# Patient Record
Sex: FEMALE | URBAN - METROPOLITAN AREA
[De-identification: names, ages, dates, MRNs, and addresses within clinical notes are randomized per-mention and may not be internally consistent; named-entity substitution may affect disease eponyms.]

---

## 2017-08-27 ENCOUNTER — HOSPITAL ENCOUNTER (EMERGENCY)
Facility: CLINIC | Age: 42
Discharge: HOME | End: 2017-08-27
Attending: EMERGENCY MEDICINE | Admitting: EMERGENCY MEDICINE
Payer: COMMERCIAL

## 2017-08-27 VITALS
BODY MASS INDEX: 27.46 KG/M2 | SYSTOLIC BLOOD PRESSURE: 142 MMHG | OXYGEN SATURATION: 98 % | HEART RATE: 100 BPM | RESPIRATION RATE: 16 BRPM | DIASTOLIC BLOOD PRESSURE: 86 MMHG | TEMPERATURE: 98 F | WEIGHT: 160 LBS

## 2017-08-27 DIAGNOSIS — F10.29 ALCOHOL DEPENDENCE WITH UNSPECIFIED ALCOHOL-INDUCED DISORDER (H): ICD-10-CM

## 2017-08-27 LAB
ALBUMIN SERPL-MCNC: 4 G/DL (ref 3.4–5)
ALP SERPL-CCNC: 141 U/L (ref 40–150)
ALT SERPL W P-5'-P-CCNC: 288 U/L (ref 0–50)
AMPHETAMINES UR QL SCN: NEGATIVE
ANION GAP SERPL CALCULATED.3IONS-SCNC: 18 MMOL/L (ref 3–14)
AST SERPL W P-5'-P-CCNC: 242 U/L (ref 0–45)
BARBITURATES UR QL: NEGATIVE
BASOPHILS # BLD AUTO: 0 10E9/L (ref 0–0.2)
BASOPHILS NFR BLD AUTO: 0.4 %
BENZODIAZ UR QL: NEGATIVE
BILIRUB SERPL-MCNC: 1.1 MG/DL (ref 0.2–1.3)
BUN SERPL-MCNC: 14 MG/DL (ref 7–30)
CALCIUM SERPL-MCNC: 8.6 MG/DL (ref 8.5–10.1)
CANNABINOIDS UR QL SCN: NEGATIVE
CHLORIDE SERPL-SCNC: 101 MMOL/L (ref 94–109)
CO2 SERPL-SCNC: 19 MMOL/L (ref 20–32)
COCAINE UR QL: NEGATIVE
CREAT SERPL-MCNC: 0.56 MG/DL (ref 0.52–1.04)
DIFFERENTIAL METHOD BLD: NORMAL
EOSINOPHIL # BLD AUTO: 0 10E9/L (ref 0–0.7)
EOSINOPHIL NFR BLD AUTO: 0.1 %
ERYTHROCYTE [DISTWIDTH] IN BLOOD BY AUTOMATED COUNT: 12.1 % (ref 10–15)
ETHANOL SERPL-MCNC: <0.01 G/DL
GFR SERPL CREATININE-BSD FRML MDRD: >90 ML/MIN/1.7M2
GLUCOSE SERPL-MCNC: 138 MG/DL (ref 70–99)
HCT VFR BLD AUTO: 41.2 % (ref 35–47)
HGB BLD-MCNC: 15 G/DL (ref 11.7–15.7)
IMM GRANULOCYTES # BLD: 0 10E9/L (ref 0–0.4)
IMM GRANULOCYTES NFR BLD: 0.1 %
LIPASE SERPL-CCNC: 81 U/L (ref 73–393)
LYMPHOCYTES # BLD AUTO: 0.9 10E9/L (ref 0.8–5.3)
LYMPHOCYTES NFR BLD AUTO: 11.9 %
MAGNESIUM SERPL-MCNC: 1.4 MG/DL (ref 1.6–2.3)
MCH RBC QN AUTO: 32.8 PG (ref 26.5–33)
MCHC RBC AUTO-ENTMCNC: 36.4 G/DL (ref 31.5–36.5)
MCV RBC AUTO: 90 FL (ref 78–100)
MONOCYTES # BLD AUTO: 0.3 10E9/L (ref 0–1.3)
MONOCYTES NFR BLD AUTO: 3.5 %
NEUTROPHILS # BLD AUTO: 6.6 10E9/L (ref 1.6–8.3)
NEUTROPHILS NFR BLD AUTO: 84 %
NRBC # BLD AUTO: 0 10*3/UL
NRBC BLD AUTO-RTO: 0 /100
OPIATES UR QL SCN: NEGATIVE
PCP UR QL SCN: NEGATIVE
PLATELET # BLD AUTO: 273 10E9/L (ref 150–450)
POTASSIUM SERPL-SCNC: 3.4 MMOL/L (ref 3.4–5.3)
PROT SERPL-MCNC: 7.7 G/DL (ref 6.8–8.8)
RBC # BLD AUTO: 4.58 10E12/L (ref 3.8–5.2)
SODIUM SERPL-SCNC: 138 MMOL/L (ref 133–144)
WBC # BLD AUTO: 7.9 10E9/L (ref 4–11)

## 2017-08-27 PROCEDURE — 80307 DRUG TEST PRSMV CHEM ANLYZR: CPT | Performed by: EMERGENCY MEDICINE

## 2017-08-27 PROCEDURE — 96361 HYDRATE IV INFUSION ADD-ON: CPT

## 2017-08-27 PROCEDURE — 25000125 ZZHC RX 250: Performed by: EMERGENCY MEDICINE

## 2017-08-27 PROCEDURE — 83735 ASSAY OF MAGNESIUM: CPT | Performed by: EMERGENCY MEDICINE

## 2017-08-27 PROCEDURE — 99285 EMERGENCY DEPT VISIT HI MDM: CPT | Mod: 25

## 2017-08-27 PROCEDURE — 85025 COMPLETE CBC W/AUTO DIFF WBC: CPT | Performed by: EMERGENCY MEDICINE

## 2017-08-27 PROCEDURE — 80320 DRUG SCREEN QUANTALCOHOLS: CPT | Performed by: EMERGENCY MEDICINE

## 2017-08-27 PROCEDURE — 96375 TX/PRO/DX INJ NEW DRUG ADDON: CPT

## 2017-08-27 PROCEDURE — 25000128 H RX IP 250 OP 636: Performed by: EMERGENCY MEDICINE

## 2017-08-27 PROCEDURE — 96365 THER/PROPH/DIAG IV INF INIT: CPT

## 2017-08-27 PROCEDURE — 83690 ASSAY OF LIPASE: CPT | Performed by: EMERGENCY MEDICINE

## 2017-08-27 PROCEDURE — 80053 COMPREHEN METABOLIC PANEL: CPT | Performed by: EMERGENCY MEDICINE

## 2017-08-27 RX ORDER — SODIUM CHLORIDE 9 MG/ML
1000 INJECTION, SOLUTION INTRAVENOUS CONTINUOUS
Status: DISCONTINUED | OUTPATIENT
Start: 2017-08-27 | End: 2017-08-28 | Stop reason: HOSPADM

## 2017-08-27 RX ORDER — ONDANSETRON 2 MG/ML
4 INJECTION INTRAMUSCULAR; INTRAVENOUS EVERY 30 MIN PRN
Status: DISCONTINUED | OUTPATIENT
Start: 2017-08-27 | End: 2017-08-28 | Stop reason: HOSPADM

## 2017-08-27 RX ORDER — LORAZEPAM 2 MG/ML
1-2 INJECTION INTRAMUSCULAR
Status: DISCONTINUED | OUTPATIENT
Start: 2017-08-27 | End: 2017-08-28 | Stop reason: HOSPADM

## 2017-08-27 RX ORDER — ONDANSETRON 4 MG/1
4 TABLET, ORALLY DISINTEGRATING ORAL EVERY 4 HOURS PRN
Qty: 10 TABLET | Refills: 0 | Status: SHIPPED | OUTPATIENT
Start: 2017-08-27 | End: 2017-08-30

## 2017-08-27 RX ADMIN — ONDANSETRON 4 MG: 2 SOLUTION INTRAMUSCULAR; INTRAVENOUS at 18:03

## 2017-08-27 RX ADMIN — SODIUM CHLORIDE 1000 ML: 9 INJECTION, SOLUTION INTRAVENOUS at 17:51

## 2017-08-27 RX ADMIN — FOLIC ACID: 5 INJECTION, SOLUTION INTRAMUSCULAR; INTRAVENOUS; SUBCUTANEOUS at 20:26

## 2017-08-27 RX ADMIN — LORAZEPAM 1 MG: 2 INJECTION INTRAMUSCULAR; INTRAVENOUS at 18:03

## 2017-08-27 ASSESSMENT — ENCOUNTER SYMPTOMS
TREMORS: 1
NERVOUS/ANXIOUS: 1
DIARRHEA: 0
FEVER: 0
VOMITING: 1
APPETITE CHANGE: 1
DYSURIA: 0
SHORTNESS OF BREATH: 0
HEADACHES: 0
FREQUENCY: 0
CONSTIPATION: 0
SPEECH DIFFICULTY: 0
HEMATURIA: 0
NAUSEA: 1
DIFFICULTY URINATING: 0
ABDOMINAL PAIN: 0

## 2017-08-27 NOTE — ED AVS SNAPSHOT
Emergency Department    [address] [place] [address]    Phone:  [phone]    Fax:  [phone]                                       Riya Ugalde   MRN: [id number]    Department:   Emergency Department   Date of Visit:  8/27/2017           After Visit Summary Signature Page     I have received my discharge instructions, and my questions have been answered. I have discussed any challenges I see with this plan with the nurse or doctor.    ..........................................................................................................................................  Patient/Patient Representative Signature      ..........................................................................................................................................  Patient Representative Print Name and Relationship to Patient    ..................................................               ................................................  Date                                            Time    ..........................................................................................................................................  Reviewed by Signature/Title    ...................................................              ..............................................  Date                                                            Time

## 2017-08-27 NOTE — ED AVS SNAPSHOT
Emergency Department    [address] [facility] [address]    Phone:  [phone]    Fax:  [phone]                                       Riya Ugalde   MRN: [id number]    Department:   Emergency Department   Date of Visit:  8/27/2017           Patient Information     Date Of Birth          1975        Your diagnoses for this visit were:     Alcohol dependence with unspecified alcohol-induced disorder (H)        You were seen by Rob Alaniz MD.      Follow-up Information     Schedule an appointment as soon as possible for a visit with Verenice Goetz.    Specialty:  Internal Medicine    Contact information:    [facility]  [address]  [place] [address] [phone]          Discharge Instructions         Alcohol Addiction  Are you addicted to alcohol?    You may be addicted to alcohol if your drinking harms yourself or others or leads to other problems with your daily life.  The medical term for this is alcohol use disorder. Your healthcare provider may diagnose you with this disorder if you have at least two of the following problems within the span of a year:    You drink alcohol in larger amounts or for a longer period than you intended.    You frequently want to cut down or control alcohol use, or have frequently failed efforts to do so.    You spend a lot of time getting alcohol, using alcohol, or recovering from alcohol use.    You crave or have a strong desire or urge to drink alcohol.    Ongoing alcohol use makes it hard for you to be responsible at work, school, or home.    You continue to use alcohol even though you have had problems in relationships or social settings because of it.    You give up or miss important social, work, or recreational activities because of alcohol use.    You drink alcohol in situations in which it is physically unsafe, such as drinking then driving while intoxicated.    You continue to drink alcohol despite  knowing it has caused physical or emotional problems.    You need more and more alcohol to get the same effects.    You hide how much alcohol you drink from family and friends.    You have withdrawal symptoms or use alcohol to avoid withdrawal symptoms.  Date Last Reviewed: 2/1/2017 2000-2017 [employer] [address] [place] [address]. All rights reserved. This information is not intended as a substitute for professional medical care. Always follow your healthcare professional's instructions.          Future Appointments        Provider Department Dept Phone Center    8/28/2017 10:00 AM Paz Villegas MD [facility] [phone] [facility]      24 Hour Appointment Hotline       To make an appointment at any [facility], call [phone]). If you don't have a family doctor or clinic, we will help you find one. [facility] clinics are conveniently located to serve the needs of you and your family.             Review of your medicines      START taking        Dose / Directions Last dose taken    ondansetron 4 MG ODT tab   Commonly known as:  ZOFRAN ODT   Dose:  4 mg   Quantity:  10 tablet        Take 1 tablet (4 mg) by mouth every 4 hours as needed   Refills:  0          Our records show that you are taking the medicines listed below. If these are incorrect, please call your family doctor or clinic.        Dose / Directions Last dose taken    Potassium Chloride ER 20 MEQ Tbcr   Dose:  20 mEq   Quantity:  3 tablet        Take 1 tablet (20 mEq) by mouth daily   Refills:  0        PROZAC PO   Dose:  20 mg        Take 20 mg by mouth   Refills:  0        sucralfate 1 GM/10ML suspension   Commonly known as:  CARAFATE   Dose:  1 g   Quantity:  420 mL        Take 10 mLs (1 g) by mouth 4 times daily   Refills:  1                Prescriptions were sent or printed at these locations (1 Prescription)                   Other Prescriptions                 Printed at Department/Unit printer (1 of 1)         ondansetron (ZOFRAN ODT) 4 MG ODT tab                Procedures and tests performed during your visit     Alcohol ethyl    CBC with platelets differential    Cardiac Continuous Monitoring    Comprehensive metabolic panel    Drug abuse screen 77 urine ([place],[facility])    Lipase    Magnesium    Pulse oximetry nursing      Orders Needing Specimen Collection     None      Pending Results     No orders found from 8/25/2017 to 8/28/2017.            Pending Culture Results     No orders found from 8/25/2017 to 8/28/2017.            Pending Results Instructions     If you had any lab results that were not finalized at the time of your Discharge, you can call the ED Lab Result RN at [phone]. You will be contacted by this team for any positive Lab results or changes in treatment. The nurses are available 7 days a week from 10A to 6:30P.  You can leave a message 24 hours per day and they will return your call.        Test Results From Your Hospital Stay        8/27/2017  6:18 PM      Component Results     Component Value Ref Range & Units Status    Ethanol g/dL <0.01 <0.01 g/dL Final         8/27/2017  6:04 PM      Component Results     Component Value Ref Range & Units Status    WBC 7.9 4.0 - 11.0 10e9/L Final    RBC Count 4.58 3.8 - 5.2 10e12/L Final    Hemoglobin 15.0 11.7 - 15.7 g/dL Final    Hematocrit 41.2 35.0 - 47.0 % Final    MCV 90 78 - 100 fl Final    MCH 32.8 26.5 - 33.0 pg Final    MCHC 36.4 31.5 - 36.5 g/dL Final    RDW 12.1 10.0 - 15.0 % Final    Platelet Count 273 150 - 450 10e9/L Final    Diff Method Automated Method  Final    % Neutrophils 84.0 % Final    % Lymphocytes 11.9 % Final    % Monocytes 3.5 % Final    % Eosinophils 0.1 % Final    % Basophils 0.4 % Final    % Immature Granulocytes 0.1 % Final    Nucleated RBCs 0 0 /100 Final    Absolute Neutrophil 6.6 1.6 - 8.3 10e9/L Final    Absolute Lymphocytes 0.9 0.8 - 5.3 10e9/L Final    Absolute Monocytes  0.3 0.0 - 1.3 10e9/L Final    Absolute Eosinophils 0.0 0.0 - 0.7 10e9/L Final    Absolute Basophils 0.0 0.0 - 0.2 10e9/L Final    Abs Immature Granulocytes 0.0 0 - 0.4 10e9/L Final    Absolute Nucleated RBC 0.0  Final         8/27/2017  6:20 PM      Component Results     Component Value Ref Range & Units Status    Sodium 138 133 - 144 mmol/L Final    Potassium 3.4 3.4 - 5.3 mmol/L Final    Chloride 101 94 - 109 mmol/L Final    Carbon Dioxide 19 (L) 20 - 32 mmol/L Final    Anion Gap 18 (H) 3 - 14 mmol/L Final    Glucose 138 (H) 70 - 99 mg/dL Final    Urea Nitrogen 14 7 - 30 mg/dL Final    Creatinine 0.56 0.52 - 1.04 mg/dL Final    GFR Estimate >90 >60 mL/min/1.7m2 Final    Non  GFR Calc    GFR Estimate If Black >90 >60 mL/min/1.7m2 Final    African American GFR Calc    Calcium 8.6 8.5 - 10.1 mg/dL Final    Bilirubin Total 1.1 0.2 - 1.3 mg/dL Final    Albumin 4.0 3.4 - 5.0 g/dL Final    Protein Total 7.7 6.8 - 8.8 g/dL Final    Alkaline Phosphatase 141 40 - 150 U/L Final     (H) 0 - 50 U/L Final     (H) 0 - 45 U/L Final         8/27/2017  6:18 PM      Component Results     Component Value Ref Range & Units Status    Lipase 81 73 - 393 U/L Final         8/27/2017  6:18 PM      Component Results     Component Value Ref Range & Units Status    Magnesium 1.4 (L) 1.6 - 2.3 mg/dL Final         8/27/2017  8:16 PM      Component Results     Component Value Ref Range & Units Status    Amphetamine Qual Urine Negative NEG^Negative Final    Cutoff for a negative amphetamine is 500 ng/mL or less.    Barbiturates Qual Urine Negative NEG^Negative Final    Cutoff for a negative barbiturate is 200 ng/mL or less.    Benzodiazepine Qual Urine Negative NEG^Negative Final    Cutoff for a negative benzodiazepine is 200 ng/mL or less.    Cannabinoids Qual Urine Negative NEG^Negative Final    Cutoff for a negative cannabinoid is 50 ng/mL or less.    Cocaine Qual Urine Negative NEG^Negative Final    Cutoff for a  negative cocaine is 300 ng/mL or less.    Opiates Qualitative Urine Negative NEG^Negative Final    Cutoff for a negative opiate is 300 ng/mL or less.    PCP Qual Urine Negative NEG^Negative Final    Cutoff for a negative PCP is 25 ng/mL or less.                Clinical Quality Measure: Blood Pressure Screening     Your blood pressure was checked while you were in the emergency department today. The last reading we obtained was  BP: 126/71 . Please read the guidelines below about what these numbers mean and what you should do about them.  If your systolic blood pressure (the top number) is less than 120 and your diastolic blood pressure (the bottom number) is less than 80, then your blood pressure is normal. There is nothing more that you need to do about it.  If your systolic blood pressure (the top number) is 120-139 or your diastolic blood pressure (the bottom number) is 80-89, your blood pressure may be higher than it should be. You should have your blood pressure rechecked within a year by a primary care provider.  If your systolic blood pressure (the top number) is 140 or greater or your diastolic blood pressure (the bottom number) is 90 or greater, you may have high blood pressure. High blood pressure is treatable, but if left untreated over time it can put you at risk for heart attack, stroke, or kidney failure. You should have your blood pressure rechecked by a primary care provider within the next 4 weeks.  If your provider in the emergency department today gave you specific instructions to follow-up with your doctor or provider even sooner than that, you should follow that instruction and not wait for up to 4 weeks for your follow-up visit.        Thank you for choosing [facility]       Thank you for choosing [facility] for your care. Our goal is always to provide you with excellent care. Hearing back from our patients is one way we can continue to improve our services. Please take a few minutes to complete  "the written survey that you may receive in the mail after you visit with us. Thank you!        [employer] Information     [employer] lets you send messages to your doctor, view your test results, renew your prescriptions, schedule appointments and more. To sign up, go to [email or web address] . Click on \"Log in\" on the left side of the screen, which will take you to the Welcome page. Then click on \"Sign up Now\" on the right side of the page.     You will be asked to enter the access code listed below, as well as some personal information. Please follow the directions to create your username and password.     Your access code is: [email or web address]  Expires: 2017  6:31 AM     Your access code will  in 90 days. If you need help or a new code, please call your [facility] clinic or [phone].        Care EveryWhere ID     This is your Care EveryWhere ID. This could be used by other organizations to access your [facility] medical records  [id number]        Equal Access to Services     JASON FENTON : Hadeliot pinzono Sozeeshan, waaxda luqadaha, qaybta kaalmarobe holm, bruce baez . So [facility] [phone].    ATENCIÓN: Si habla español, tiene a krishna disposición servicios gratuitos de asistencia lingüística. Llame al [phone].    We comply with applicable federal civil rights laws and [place] laws. We do not discriminate on the basis of race, color, national origin, age, disability sex, sexual orientation or gender identity.            After Visit Summary       This is your record. Keep this with you and show to your community pharmacist(s) and doctor(s) at your next visit.                  "

## 2017-08-27 NOTE — ED PROVIDER NOTES
History     Chief Complaint:  Withdrawal    HPI   Riya Ugalde is a 42 year old female with a history of depression and anxiety who presents to the ED for alcohol withdrawal. The patient reports that she has been drinking most days for the past 5 years amounting to about 2 bottles of champagne typically. The patient's last drink was about 24 hours ago and denies alcohol consumption today. She decided to quit drinking today as she states it has been a problem for too long and also because her Prozac dosage was increased about a month ago. Today she has been unable to keep any food or liquids down and has been vomiting. She notes taking 4 rylie seltzer tablets today to try to calm her stomach down. She denies any urinary or bowel problems, abdominal pain, fever, chest pain, shortness of breath, headache, rash, or any other symptoms nor is she having any suicidal or homicidal thoughts. She is looking to start outpatient treatment as she notes it has worked well for one of her friends. She presents to the ED to get her shaking and dehydration under control.    Allergies:  No known drug allergies    Medications:    Carafate  Potassium Chloride  Prozac    Past Medical History:    Spontaneous   Genital herpes  Anxiety  Depression    Past Surgical History:    Cholecystectomy  Dilation and curettage suction    Family History:    History reviewed. No pertinent family history.     Social History:  Smoking status: Former  Alcohol use: Yes  Occupation:   Marital Status:       Review of Systems   Constitutional: Positive for appetite change (decreased). Negative for fever.   Respiratory: Negative for shortness of breath.    Cardiovascular: Negative for chest pain.   Gastrointestinal: Positive for nausea and vomiting. Negative for abdominal pain, constipation and diarrhea.   Genitourinary: Negative for decreased urine volume, difficulty urinating, dysuria, frequency, hematuria and urgency.    Skin: Negative for rash.   Neurological: Positive for tremors. Negative for speech difficulty and headaches.   Psychiatric/Behavioral: Negative for self-injury and suicidal ideas. The patient is nervous/anxious.    All other systems reviewed and are negative.    Physical Exam   Patient Vitals for the past 24 hrs:   BP Temp Temp src Pulse Heart Rate Resp SpO2 Weight   08/27/17 1915 126/71 - - - - - - -   08/27/17 1900 121/76 - - - - - 98 % -   08/27/17 1845 123/78 - - - - - 98 % -   08/27/17 1830 124/75 - - - - - 99 % -   08/27/17 1815 (!) 111/95 - - - - - 100 % -   08/27/17 1739 145/89 98  F (36.7  C) Oral 100 100 20 100 % 72.6 kg (160 lb)        Physical Exam  Constitutional: White female, tremulous.   HENT: No signs of trauma.   Eyes: EOM are normal. Pupils are equal, round, and reactive to light.   Neck: Normal range of motion. No JVD present. No cervical adenopathy.  Cardiovascular: Regular rhythm.  Exam reveals no gallop and no friction rub.    No murmur heard.  Pulmonary/Chest: Bilateral breath sounds normal. No wheezes, rhonchi or rales.  Abdominal: Soft. No tenderness. No rebound or guarding.   Musculoskeletal: No edema. No tenderness.   Lymphadenopathy: No lymphadenopathy.   Neurological: Alert and oriented to person, place, and time. Normal strength. Coordination normal.   Skin: Skin is warm and dry. No rash noted. No erythema.   Psych: Anxious. No suicidal or homicidal thinking.    Emergency Department Course     Laboratory:  Alcohol ethyl: <0.01  CBC: WBC 7.9, HGB 15.0,   CMP: Glucose 138, , Carbon Dioxide 19, , Anion Gap 18, Creatinine 0.56  Lipase: 81  Magnesium: 1.4    Interventions:  1751: NS 1L IV Bolus  1803: Zofran 4mg IV   1803: Ativan 1 mg IV  2026: dextrose 5% and 0.9% NaCl 1,000 mL with Infuvite 10 mL, thiamine 100 mg, folic acid 1 mg, magnesium sulfate 2 g IV infusion    Emergency Department Course:  Past medical records, nursing notes, and vitals reviewed.  1736: I  performed an exam of the patient and obtained history, as documented above. GCS 15.    IV inserted and blood drawn. The patient was placed on continuous cardiac monitoring and pulse oximetry.    2205: I rechecked the patient. Findings and plan explained to the Patient. Patient discharged home with instructions regarding supportive care, medications, and reasons to return. The importance of close follow-up was reviewed.     Impression & Plan      Medical Decision Making:  Riya Ugalde is a 42 year old female who is here to help with her alcohol problem. She states she has been drinking most days for the last several years. Up to 2 bottles of champagne and/or vodka. She decided yesterday to stop drinking and she has been very shaky today. She has been nauseated. She does not have abdominal pain or diarrhea. On exam she is tremulous although her vital signs are stable. Her labs revealed low Magnesium and some mild elevation of her LFTs. Patient received IV fluids, a banana bag, one dose of Ativan and Zofran and she is feeling much better. She has a friend with her and she will be discharged home. She will contact the sources given for alcohol and wants to participate in an outpatient treatment program.     Impression:  1. Alcohol dependence  2. Mild alcoholic hepatitis  3. Hypomagenesia      Disposition:  discharged to home    Discharge Medications:      Details   ondansetron (ZOFRAN ODT) 4 MG ODT tab Take 1 tablet (4 mg) by mouth every 4 hours as needed, Disp-10 tablet, R-0, Local Print            Alison Becker  8/27/2017    EMERGENCY DEPARTMENT  I, Alison Becker, am serving as a scribe at 5:36 PM on 8/27/2017 to document services personally performed by Rob Alaniz MD based on my observations and the provider's statements to me.        Rob Alaniz MD  08/27/17 3349

## 2017-08-28 NOTE — DISCHARGE INSTRUCTIONS
Alcohol Addiction  Are you addicted to alcohol?    You may be addicted to alcohol if your drinking harms yourself or others or leads to other problems with your daily life.  The medical term for this is alcohol use disorder. Your healthcare provider may diagnose you with this disorder if you have at least two of the following problems within the span of a year:    You drink alcohol in larger amounts or for a longer period than you intended.    You frequently want to cut down or control alcohol use, or have frequently failed efforts to do so.    You spend a lot of time getting alcohol, using alcohol, or recovering from alcohol use.    You crave or have a strong desire or urge to drink alcohol.    Ongoing alcohol use makes it hard for you to be responsible at work, school, or home.    You continue to use alcohol even though you have had problems in relationships or social settings because of it.    You give up or miss important social, work, or recreational activities because of alcohol use.    You drink alcohol in situations in which it is physically unsafe, such as drinking then driving while intoxicated.    You continue to drink alcohol despite knowing it has caused physical or emotional problems.    You need more and more alcohol to get the same effects.    You hide how much alcohol you drink from family and friends.    You have withdrawal symptoms or use alcohol to avoid withdrawal symptoms.  Date Last Reviewed: 2/1/2017 2000-2017 [employer]. [address] [place] [address], [place] [address]. All rights reserved. This information is not intended as a substitute for professional medical care. Always follow your healthcare professional's instructions.

## 2017-11-11 ENCOUNTER — HOSPITAL ENCOUNTER (EMERGENCY)
Facility: CLINIC | Age: 42
Discharge: HOME | End: 2017-11-11
Attending: EMERGENCY MEDICINE | Admitting: EMERGENCY MEDICINE
Payer: COMMERCIAL

## 2017-11-11 VITALS
SYSTOLIC BLOOD PRESSURE: 147 MMHG | RESPIRATION RATE: 18 BRPM | DIASTOLIC BLOOD PRESSURE: 91 MMHG | HEIGHT: 65 IN | BODY MASS INDEX: 24.96 KG/M2 | OXYGEN SATURATION: 100 % | HEART RATE: 84 BPM | WEIGHT: 149.8 LBS | TEMPERATURE: 97.3 F

## 2017-11-11 DIAGNOSIS — R73.09 ELEVATED GLUCOSE: ICD-10-CM

## 2017-11-11 DIAGNOSIS — R11.2 NAUSEA AND VOMITING, INTRACTABILITY OF VOMITING NOT SPECIFIED, UNSPECIFIED VOMITING TYPE: ICD-10-CM

## 2017-11-11 DIAGNOSIS — F10.20 ALCOHOLISM (H): ICD-10-CM

## 2017-11-11 LAB
ALBUMIN SERPL-MCNC: 4.2 G/DL (ref 3.4–5)
ALP SERPL-CCNC: 121 U/L (ref 40–150)
ALT SERPL W P-5'-P-CCNC: 48 U/L (ref 0–50)
ANION GAP SERPL CALCULATED.3IONS-SCNC: 14 MMOL/L (ref 3–14)
AST SERPL W P-5'-P-CCNC: 23 U/L (ref 0–45)
BASOPHILS # BLD AUTO: 0 10E9/L (ref 0–0.2)
BASOPHILS NFR BLD AUTO: 0.1 %
BILIRUB SERPL-MCNC: 1.4 MG/DL (ref 0.2–1.3)
BUN SERPL-MCNC: 22 MG/DL (ref 7–30)
CALCIUM SERPL-MCNC: 9 MG/DL (ref 8.5–10.1)
CHLORIDE SERPL-SCNC: 98 MMOL/L (ref 94–109)
CO2 SERPL-SCNC: 22 MMOL/L (ref 20–32)
CREAT SERPL-MCNC: 0.59 MG/DL (ref 0.52–1.04)
DIFFERENTIAL METHOD BLD: ABNORMAL
EOSINOPHIL # BLD AUTO: 0 10E9/L (ref 0–0.7)
EOSINOPHIL NFR BLD AUTO: 0 %
ERYTHROCYTE [DISTWIDTH] IN BLOOD BY AUTOMATED COUNT: 12.3 % (ref 10–15)
GFR SERPL CREATININE-BSD FRML MDRD: >90 ML/MIN/1.7M2
GLUCOSE SERPL-MCNC: 217 MG/DL (ref 70–99)
HCT VFR BLD AUTO: 42.1 % (ref 35–47)
HGB BLD-MCNC: 15.4 G/DL (ref 11.7–15.7)
IMM GRANULOCYTES # BLD: 0 10E9/L (ref 0–0.4)
IMM GRANULOCYTES NFR BLD: 0.3 %
LIPASE SERPL-CCNC: 79 U/L (ref 73–393)
LYMPHOCYTES # BLD AUTO: 0.9 10E9/L (ref 0.8–5.3)
LYMPHOCYTES NFR BLD AUTO: 10.2 %
MCH RBC QN AUTO: 32.2 PG (ref 26.5–33)
MCHC RBC AUTO-ENTMCNC: 36.6 G/DL (ref 31.5–36.5)
MCV RBC AUTO: 88 FL (ref 78–100)
MONOCYTES # BLD AUTO: 0.7 10E9/L (ref 0–1.3)
MONOCYTES NFR BLD AUTO: 8.5 %
NEUTROPHILS # BLD AUTO: 7 10E9/L (ref 1.6–8.3)
NEUTROPHILS NFR BLD AUTO: 80.9 %
NRBC # BLD AUTO: 0 10*3/UL
NRBC BLD AUTO-RTO: 0 /100
PLATELET # BLD AUTO: 385 10E9/L (ref 150–450)
POTASSIUM SERPL-SCNC: 3.5 MMOL/L (ref 3.4–5.3)
PROT SERPL-MCNC: 8.3 G/DL (ref 6.8–8.8)
RBC # BLD AUTO: 4.79 10E12/L (ref 3.8–5.2)
SODIUM SERPL-SCNC: 134 MMOL/L (ref 133–144)
WBC # BLD AUTO: 8.6 10E9/L (ref 4–11)

## 2017-11-11 PROCEDURE — 96361 HYDRATE IV INFUSION ADD-ON: CPT

## 2017-11-11 PROCEDURE — 83690 ASSAY OF LIPASE: CPT | Performed by: EMERGENCY MEDICINE

## 2017-11-11 PROCEDURE — 96374 THER/PROPH/DIAG INJ IV PUSH: CPT

## 2017-11-11 PROCEDURE — 85025 COMPLETE CBC W/AUTO DIFF WBC: CPT | Performed by: EMERGENCY MEDICINE

## 2017-11-11 PROCEDURE — 25000128 H RX IP 250 OP 636: Performed by: EMERGENCY MEDICINE

## 2017-11-11 PROCEDURE — 99285 EMERGENCY DEPT VISIT HI MDM: CPT | Mod: 25

## 2017-11-11 PROCEDURE — 96375 TX/PRO/DX INJ NEW DRUG ADDON: CPT

## 2017-11-11 PROCEDURE — 80053 COMPREHEN METABOLIC PANEL: CPT | Performed by: EMERGENCY MEDICINE

## 2017-11-11 RX ORDER — ONDANSETRON 2 MG/ML
4 INJECTION INTRAMUSCULAR; INTRAVENOUS ONCE
Status: COMPLETED | OUTPATIENT
Start: 2017-11-11 | End: 2017-11-11

## 2017-11-11 RX ORDER — SODIUM CHLORIDE 9 MG/ML
1000 INJECTION, SOLUTION INTRAVENOUS CONTINUOUS
Status: DISCONTINUED | OUTPATIENT
Start: 2017-11-11 | End: 2017-11-11 | Stop reason: HOSPADM

## 2017-11-11 RX ORDER — ONDANSETRON 2 MG/ML
4 INJECTION INTRAMUSCULAR; INTRAVENOUS
Status: COMPLETED | OUTPATIENT
Start: 2017-11-11 | End: 2017-11-11

## 2017-11-11 RX ORDER — ONDANSETRON 4 MG/1
4 TABLET, ORALLY DISINTEGRATING ORAL EVERY 8 HOURS PRN
Qty: 10 TABLET | Refills: 0 | Status: SHIPPED | OUTPATIENT
Start: 2017-11-11 | End: 2017-11-14

## 2017-11-11 RX ADMIN — ONDANSETRON 4 MG: 2 INJECTION INTRAMUSCULAR; INTRAVENOUS at 03:40

## 2017-11-11 RX ADMIN — SODIUM CHLORIDE 1000 ML: 9 INJECTION, SOLUTION INTRAVENOUS at 02:21

## 2017-11-11 RX ADMIN — ONDANSETRON 4 MG: 2 INJECTION INTRAMUSCULAR; INTRAVENOUS at 02:23

## 2017-11-11 ASSESSMENT — ENCOUNTER SYMPTOMS
NAUSEA: 1
VOMITING: 1
ABDOMINAL PAIN: 0

## 2017-11-11 NOTE — ED AVS SNAPSHOT
Emergency Department    [address] [facility] [address]    Phone:  [phone]    Fax:  [phone]                                       Riya Ugalde   MRN: [id number]    Department:   Emergency Department   Date of Visit:  11/11/2017           Patient Information     Date Of Birth          1975        Your diagnoses for this visit were:     Nausea and vomiting, intractability of vomiting not specified, unspecified vomiting type     Alcoholism (H)     Elevated glucose        You were seen by Trierweiler, Chad A, MD.      Follow-up Information     Follow up with Verenice Goetz In 1 week.    Specialty:  Internal Medicine    Contact information:    [facility]  [address]   [facility] [address] [phone]          Follow up with  Emergency Department.    Specialty:  EMERGENCY MEDICINE    Why:  If symptoms worsen    Contact information:    [address] [facility] [address] [phone]        Discharge Instructions       Discharge Instructions  Vomiting    You have been seen today for vomiting (throwing up). This is usually caused by a virus, but some bacteria, parasites, medicines or other medical conditions can cause similar symptoms. At this time your provider does not find that your vomiting is a sign of anything dangerous or life-threatening. However, sometimes the signs of serious illness do not show up right away. If you have new or worse symptoms, you may need to be seen again in the Emergency Department or by your primary provider. Remember that serious problems like appendicitis can start as vomiting.    Generally, every Emergency Department visit should have a follow-up clinic visit with either a primary or a specialty clinic/provider. Please follow-up as instructed by your emergency provider today.    Return to the Emergency Department if:    You keep vomiting and you are not able to keep liquids down.     You feel you are  getting dehydrated, such as being very thirsty, not urinating (peeing) at least every 8-12 hours, or feeling faint or lightheaded.     You develop a new fever, or your fever continues for more than 2 days.     You have abdominal (belly pain) that seems worse than cramps, is in one spot, or is getting worse over time. Appendicitis usually causes pain in the right lower abdomen (to the right and below your belly button) so watch for pain in this location.    You have blood in your vomit or stools.     You feel very weak.    You are not starting to improve within 24 hours of your visit here.     What can I do to help myself?    The most important thing to do is to drink clear liquids. If you have been vomiting a lot, it is best to have only small, frequent sips of liquids. Drinking too much at once may cause more vomiting. If you are vomiting often, you must replace minerals, sodium and potassium lost with your illness. Pedialyte  is the best available rehydration liquid but some find that it doesn t taste good so sports drinks are an alterative. You can also drink clear liquids such as water, weak tea, apple juice, and 7-Up . Avoid acid liquids (orange), caffeine (coffee) or alcohol. Do not drink milk until you no longer have diarrhea (loose stools).     After liquids are staying down, you may start eating mild foods. Soda crackers, toast, plain noodles, gelatin, applesauce and bananas are good first choices. Avoid foods that have acid, are spicy, fatty or have a lot of fiber (such as meats, coarse grains, vegetables). You may start eating these foods again in about 3 days when you are better.     Sometimes treatment includes prescription medicine to prevent nausea (sick to your stomach) and vomiting. If your provider prescribes these for you, take them as directed.     Do not take ibuprofen, naproxen, or other nonsteroidal anti-inflammatory (NSAID) medicines without checking with your healthcare provider.     If you  were given a prescription for medicine here today, be sure to read all of the information (including the package insert) that comes with your prescription.  This will include important information about the medicine, its side effects, and any warnings that you need to know about.  The pharmacist who fills the prescription can provide more information and answer questions you may have about the medicine.  If you have questions or concerns that the pharmacist cannot address, please call or return to the Emergency Department.     Remember that you can always come back to the Emergency Department if you are not able to see your regular provider in the amount of time listed above, if you get any new symptoms, or if there is anything that worries you.        24 Hour Appointment Hotline       To make an appointment at any [facility], call [phone] ([phone]). If you don't have a family doctor or clinic, we will help you find one. [facility] are conveniently located to serve the needs of you and your family.             Review of your medicines      START taking        Dose / Directions Last dose taken    ondansetron 4 MG ODT tab   Commonly known as:  ZOFRAN ODT   Dose:  4 mg   Quantity:  10 tablet        Take 1 tablet (4 mg) by mouth every 8 hours as needed   Refills:  0          Our records show that you are taking the medicines listed below. If these are incorrect, please call your family doctor or clinic.        Dose / Directions Last dose taken    Potassium Chloride ER 20 MEQ Tbcr   Dose:  20 mEq   Quantity:  3 tablet        Take 1 tablet (20 mEq) by mouth daily   Refills:  0        PROZAC PO   Dose:  20 mg        Take 20 mg by mouth   Refills:  0        sucralfate 1 GM/10ML suspension   Commonly known as:  CARAFATE   Dose:  1 g   Quantity:  420 mL        Take 10 mLs (1 g) by mouth 4 times daily   Refills:  1                Prescriptions were sent or printed at these locations (1 Prescription)                    Other Prescriptions                Printed at Department/Unit printer (1 of 1)         ondansetron (ZOFRAN ODT) 4 MG ODT tab                Procedures and tests performed during your visit     CBC with platelets differential    Comprehensive metabolic panel    Lipase    Peripheral IV catheter      Orders Needing Specimen Collection     Ordered          11/11/17 0200  UA with Microscopic - STAT, Prio: STAT, Needs to be Collected     Scheduled Task Status   11/11/17 0201 Collect UA with Microscopic Open   Order Class:  PCU Collect                11/11/17 0200  HCG qualitative urine - STAT, Prio: STAT, Needs to be Collected     Scheduled Task Status   11/11/17 0201 Collect HCG qualitative urine Open   Order Class:  PCU Collect                  Pending Results     No orders found from 11/9/2017 to 11/12/2017.            Pending Culture Results     No orders found from 11/9/2017 to 11/12/2017.            Pending Results Instructions     If you had any lab results that were not finalized at the time of your Discharge, you can call the ED Lab Result RN at [phone]. You will be contacted by this team for any positive Lab results or changes in treatment. The nurses are available 7 days a week from 10A to 6:30P.  You can leave a message 24 hours per day and they will return your call.        Test Results From Your Hospital Stay        11/11/2017  2:31 AM      Component Results     Component Value Ref Range & Units Status    WBC 8.6 4.0 - 11.0 10e9/L Final    RBC Count 4.79 3.8 - 5.2 10e12/L Final    Hemoglobin 15.4 11.7 - 15.7 g/dL Final    Hematocrit 42.1 35.0 - 47.0 % Final    MCV 88 78 - 100 fl Final    MCH 32.2 26.5 - 33.0 pg Final    MCHC 36.6 (H) 31.5 - 36.5 g/dL Final    RDW 12.3 10.0 - 15.0 % Final    Platelet Count 385 150 - 450 10e9/L Final    Diff Method Automated Method  Final    % Neutrophils 80.9 % Final    % Lymphocytes 10.2 % Final    % Monocytes 8.5 % Final    % Eosinophils 0.0 % Final     % Basophils 0.1 % Final    % Immature Granulocytes 0.3 % Final    Nucleated RBCs 0 0 /100 Final    Absolute Neutrophil 7.0 1.6 - 8.3 10e9/L Final    Absolute Lymphocytes 0.9 0.8 - 5.3 10e9/L Final    Absolute Monocytes 0.7 0.0 - 1.3 10e9/L Final    Absolute Eosinophils 0.0 0.0 - 0.7 10e9/L Final    Absolute Basophils 0.0 0.0 - 0.2 10e9/L Final    Abs Immature Granulocytes 0.0 0 - 0.4 10e9/L Final    Absolute Nucleated RBC 0.0  Final         11/11/2017  2:48 AM      Component Results     Component Value Ref Range & Units Status    Sodium 134 133 - 144 mmol/L Final    Potassium 3.5 3.4 - 5.3 mmol/L Final    Chloride 98 94 - 109 mmol/L Final    Carbon Dioxide 22 20 - 32 mmol/L Final    Anion Gap 14 3 - 14 mmol/L Final    Glucose 217 (H) 70 - 99 mg/dL Final    Urea Nitrogen 22 7 - 30 mg/dL Final    Creatinine 0.59 0.52 - 1.04 mg/dL Final    GFR Estimate >90 >60 mL/min/1.7m2 Final    Non  GFR Calc    GFR Estimate If Black >90 >60 mL/min/1.7m2 Final    African American GFR Calc    Calcium 9.0 8.5 - 10.1 mg/dL Final    Bilirubin Total 1.4 (H) 0.2 - 1.3 mg/dL Final    Albumin 4.2 3.4 - 5.0 g/dL Final    Protein Total 8.3 6.8 - 8.8 g/dL Final    Alkaline Phosphatase 121 40 - 150 U/L Final    ALT 48 0 - 50 U/L Final    AST 23 0 - 45 U/L Final         11/11/2017  2:45 AM      Component Results     Component Value Ref Range & Units Status    Lipase 79 73 - 393 U/L Final                Clinical Quality Measure: Blood Pressure Screening     Your blood pressure was checked while you were in the emergency department today. The last reading we obtained was  BP: (!) 147/91 . Please read the guidelines below about what these numbers mean and what you should do about them.  If your systolic blood pressure (the top number) is less than 120 and your diastolic blood pressure (the bottom number) is less than 80, then your blood pressure is normal. There is nothing more that you need to do about it.  If your systolic blood  "pressure (the top number) is 120-139 or your diastolic blood pressure (the bottom number) is 80-89, your blood pressure may be higher than it should be. You should have your blood pressure rechecked within a year by a primary care provider.  If your systolic blood pressure (the top number) is 140 or greater or your diastolic blood pressure (the bottom number) is 90 or greater, you may have high blood pressure. High blood pressure is treatable, but if left untreated over time it can put you at risk for heart attack, stroke, or kidney failure. You should have your blood pressure rechecked by a primary care provider within the next 4 weeks.  If your provider in the emergency department today gave you specific instructions to follow-up with your doctor or provider even sooner than that, you should follow that instruction and not wait for up to 4 weeks for your follow-up visit.        Thank you for choosing [facility]       Thank you for choosing [facility] for your care. Our goal is always to provide you with excellent care. Hearing back from our patients is one way we can continue to improve our services. Please take a few minutes to complete the written survey that you may receive in the mail after you visit with us. Thank you!        [employer] Information     [employer] lets you send messages to your doctor, view your test results, renew your prescriptions, schedule appointments and more. To sign up, go to [email or web address] . Click on \"Log in\" on the left side of the screen, which will take you to the Welcome page. Then click on \"Sign up Now\" on the right side of the page.     You will be asked to enter the access code listed below, as well as some personal information. Please follow the directions to create your username and password.     Your access code is: [email or web address]  Expires: 2017  5:31 AM     Your access code will  in 90 days. If you need help or a new code, please call your [facility] clinic or " [phone].        Care EveryWhere ID     This is your Care EveryWhere ID. This could be used by other organizations to access your [facility] medical records  [id number]        Equal Access to Services     CHELSI FENTON : Anabela Mercer, fatimah funez, tyronesuzanne kamarylinrobe holm, bruce lowery. So [facility] [phone].    ATENCIÓN: Si habla español, tiene a krishna disposición servicios gratuitos de asistencia lingüística. Llame al [phone].    We comply with applicable federal civil rights laws and [place] laws. We do not discriminate on the basis of race, color, national origin, age, disability, sex, sexual orientation, or gender identity.            After Visit Summary       This is your record. Keep this with you and show to your community pharmacist(s) and doctor(s) at your next visit.

## 2017-11-11 NOTE — ED AVS SNAPSHOT
Emergency Department    [address] [place] [address]    Phone:  [phone]    Fax:  [phone]                                       Riya Ugalde   MRN: [id number]    Department:   Emergency Department   Date of Visit:  11/11/2017           After Visit Summary Signature Page     I have received my discharge instructions, and my questions have been answered. I have discussed any challenges I see with this plan with the nurse or doctor.    ..........................................................................................................................................  Patient/Patient Representative Signature      ..........................................................................................................................................  Patient Representative Print Name and Relationship to Patient    ..................................................               ................................................  Date                                            Time    ..........................................................................................................................................  Reviewed by Signature/Title    ...................................................              ..............................................  Date                                                            Time

## 2017-11-11 NOTE — ED PROVIDER NOTES
"  History     Chief Complaint:  Nausea and vomiting     HPI   Riya Ugalde is a 42 year old female with history of alcohol abuse for 5 years, who presents with nausea and vomiting.     Patient was seen 2.5 months ago at this ED requesting resources for alcohol abuse treatment and withdrawal. She underwent outpatient treatment and was abstinent until 4 days ago, 1 large bottle of vodka for 3 days. She did not have any alcohol in the past 24 hours.     She comes in because of nausea and vomiting for the past 12+ hours (since the morning), despite taking multiple OTC medications. Denies abdominal pain.    Allergies:  No Known Allergies     Medications:    Fluoxetine  Lorazepam     Past Medical History:    Genital herpes  Spontaneous   Alcohol abuse   Depression   OCD  Panic disorder  Anxiety disorder  Eating disorder   IBS   GERD  Pyelonephritis     Past Surgical History:    Cholecystectomy  D&C     Family History:    Breast cancer     Social History:  Marital Status:    Smoking status: former   Alcohol status: yes, history of abuse, \"on a burgos\" for past 3 days  Patient presents alone from home.  PCP: Verenice Goetz      Review of Systems   Gastrointestinal: Positive for nausea and vomiting. Negative for abdominal pain.   All other systems reviewed and are negative.      Physical Exam     Patient Vitals for the past 24 hrs:   BP Temp Temp src Pulse Heart Rate Resp SpO2 Height Weight   17 0403 (!) 147/91 - - 84 - 18 100 % - -   17 0330 (!) 147/91 - - 84 - 16 100 % - -   17 0300 129/81 - - 76 - 16 100 % - -   17 0230 139/84 - - 77 - 18 99 % - -   17 0204 (!) 150/92 97.3  F (36.3  C) Oral - 83 16 99 % 1.651 m (5' 5\") 67.9 kg (149 lb 12.8 oz)        Physical Exam  Eye:  Pupils are equal, round, and reactive.  Extraocular movements intact.    ENT:  No rhinorrhea.  Moist mucus membranes.  Normal tongue and tonsil.    Cardiac:  Regular rate and rhythm.  No murmurs, gallops, " or rubs.    Pulmonary:  Clear to auscultation bilaterally.  No wheezes, rales, or rhonchi.    Abdomen:  Positive bowel sounds.  Abdomen is soft and non-distended, without focal tenderness.    Musculoskeletal:  Normal movement of all extremities without evidence for deficit.    Skin:  Warm and dry without rashes.    Neurologic:  Non-focal exam without asymmetric weakness or numbness.     Psychiatric:  Normal affect with appropriate interaction with examiner.     Emergency Department Course     Laboratory:  Laboratory findings were communicated with the patient who voiced understanding of the findings.    CBC: WBC 8.6, HGB 15.4,   CMP: Glucose 217, Creatinine 0.59, Bilirubin total 1.4  Lipase: 79     Interventions:  0221: NS 1,000 mL, IV  0223: Zofran 4mg, IV   0340: Zofran 4mg, IV      Emergency Department Course:  Nursing notes and vitals reviewed.  I performed an exam of the patient as documented above.     A peripheral IV was established and blood was drawn for laboratory testing, results above.    I discussed the findings and treatment plan with the patient. She expressed understanding of this plan and consented to discharge. She will be discharged home with instructions for care and follow up. In addition, the patient will return to the emergency department if their symptoms persist, worsen, if new symptoms arise or if there is any concern.  All questions were answered.     Impression & Plan      Medical Decision Making:  This 42-year-old alcoholic known to this department returns to us because of a 3 night binge with alcohol after being sober for 90 days.  Her chief concern is that she feels very nauseous, stating that she has not been able to drink anything all day.  However, her exam is unremarkable with moist biggest membranes and normal vital signs.  A laboratory investigation was pursued which shows no evidence of metabolic derangement.  She feels improved after fluids and Zofran and I feel she is  appropriate for discharge without further workup.  The patient is comfortable with this and will continue to work on her sobriety.  She was invited back to her facility at any point for worsening of her condition or other emergent concerns.    Diagnosis:    ICD-10-CM   1. Nausea and vomiting, intractability of vomiting not specified, unspecified vomiting type R11.2   2. Alcoholism (H) F10.20   3. Elevated glucose R73.09     Disposition:   Discharge     Discharge Medication List as of 11/11/2017  3:56 AM      START taking these medications    Details   ondansetron (ZOFRAN ODT) 4 MG ODT tab Take 1 tablet (4 mg) by mouth every 8 hours as needed, Disp-10 tablet, R-0, Local Print             Scribe Disclosure:  Belinda OLGUIN, am serving as a scribe at 2:02 AM on 11/11/2017 to document services personally performed by Trierweiler, Chad A, MD, based on my observations and the provider's statements to me.     EMERGENCY DEPARTMENT       Trierweiler, Chad A, MD  11/11/17 1106

## 2017-11-14 ENCOUNTER — HOSPITAL ENCOUNTER (OUTPATIENT)
Facility: CLINIC | Age: 42
Setting detail: OBSERVATION
Discharge: HOME | End: 2017-11-15
Attending: EMERGENCY MEDICINE | Admitting: INTERNAL MEDICINE
Payer: COMMERCIAL

## 2017-11-14 DIAGNOSIS — E86.0 DEHYDRATION: ICD-10-CM

## 2017-11-14 DIAGNOSIS — E80.6 HYPERBILIRUBINEMIA: ICD-10-CM

## 2017-11-14 DIAGNOSIS — E87.6 HYPOKALEMIA: ICD-10-CM

## 2017-11-14 DIAGNOSIS — R11.2 INTRACTABLE VOMITING WITH NAUSEA, UNSPECIFIED VOMITING TYPE: ICD-10-CM

## 2017-11-14 LAB
ALBUMIN SERPL-MCNC: 3.7 G/DL (ref 3.4–5)
ALP SERPL-CCNC: 84 U/L (ref 40–150)
ALT SERPL W P-5'-P-CCNC: 38 U/L (ref 0–50)
ANION GAP SERPL CALCULATED.3IONS-SCNC: 10 MMOL/L (ref 3–14)
AST SERPL W P-5'-P-CCNC: 25 U/L (ref 0–45)
BASOPHILS # BLD AUTO: 0 10E9/L (ref 0–0.2)
BASOPHILS NFR BLD AUTO: 0.2 %
BILIRUB SERPL-MCNC: 1.5 MG/DL (ref 0.2–1.3)
BUN SERPL-MCNC: 26 MG/DL (ref 7–30)
CALCIUM SERPL-MCNC: 9.1 MG/DL (ref 8.5–10.1)
CHLORIDE SERPL-SCNC: 87 MMOL/L (ref 94–109)
CO2 SERPL-SCNC: 35 MMOL/L (ref 20–32)
CREAT SERPL-MCNC: 0.66 MG/DL (ref 0.52–1.04)
DIFFERENTIAL METHOD BLD: ABNORMAL
EOSINOPHIL # BLD AUTO: 0.1 10E9/L (ref 0–0.7)
EOSINOPHIL NFR BLD AUTO: 1 %
ERYTHROCYTE [DISTWIDTH] IN BLOOD BY AUTOMATED COUNT: 12.2 % (ref 10–15)
ETHANOL SERPL-MCNC: <0.01 G/DL
GFR SERPL CREATININE-BSD FRML MDRD: >90 ML/MIN/1.7M2
GLUCOSE SERPL-MCNC: 115 MG/DL (ref 70–99)
HCG SERPL QL: NEGATIVE
HCT VFR BLD AUTO: 41.5 % (ref 35–47)
HGB BLD-MCNC: 15.4 G/DL (ref 11.7–15.7)
IMM GRANULOCYTES # BLD: 0 10E9/L (ref 0–0.4)
IMM GRANULOCYTES NFR BLD: 0.2 %
LIPASE SERPL-CCNC: 166 U/L (ref 73–393)
LYMPHOCYTES # BLD AUTO: 2.3 10E9/L (ref 0.8–5.3)
LYMPHOCYTES NFR BLD AUTO: 22 %
MAGNESIUM SERPL-MCNC: 2.1 MG/DL (ref 1.6–2.3)
MCH RBC QN AUTO: 32.3 PG (ref 26.5–33)
MCHC RBC AUTO-ENTMCNC: 37.1 G/DL (ref 31.5–36.5)
MCV RBC AUTO: 87 FL (ref 78–100)
MONOCYTES # BLD AUTO: 0.9 10E9/L (ref 0–1.3)
MONOCYTES NFR BLD AUTO: 8.8 %
NEUTROPHILS # BLD AUTO: 7.2 10E9/L (ref 1.6–8.3)
NEUTROPHILS NFR BLD AUTO: 67.8 %
NRBC # BLD AUTO: 0 10*3/UL
NRBC BLD AUTO-RTO: 0 /100
PLATELET # BLD AUTO: 330 10E9/L (ref 150–450)
POTASSIUM SERPL-SCNC: 2.1 MMOL/L (ref 3.4–5.3)
POTASSIUM SERPL-SCNC: 2.2 MMOL/L (ref 3.4–5.3)
POTASSIUM SERPL-SCNC: 3 MMOL/L (ref 3.4–5.3)
PROT SERPL-MCNC: 7.5 G/DL (ref 6.8–8.8)
RBC # BLD AUTO: 4.77 10E12/L (ref 3.8–5.2)
SODIUM SERPL-SCNC: 132 MMOL/L (ref 133–144)
WBC # BLD AUTO: 10.6 10E9/L (ref 4–11)

## 2017-11-14 PROCEDURE — 84703 CHORIONIC GONADOTROPIN ASSAY: CPT | Performed by: EMERGENCY MEDICINE

## 2017-11-14 PROCEDURE — 84132 ASSAY OF SERUM POTASSIUM: CPT | Mod: 91 | Performed by: INTERNAL MEDICINE

## 2017-11-14 PROCEDURE — 96361 HYDRATE IV INFUSION ADD-ON: CPT

## 2017-11-14 PROCEDURE — 25000128 H RX IP 250 OP 636: Performed by: EMERGENCY MEDICINE

## 2017-11-14 PROCEDURE — 25000128 H RX IP 250 OP 636: Performed by: INTERNAL MEDICINE

## 2017-11-14 PROCEDURE — 83735 ASSAY OF MAGNESIUM: CPT | Performed by: EMERGENCY MEDICINE

## 2017-11-14 PROCEDURE — 99285 EMERGENCY DEPT VISIT HI MDM: CPT | Mod: 25

## 2017-11-14 PROCEDURE — 25000132 ZZH RX MED GY IP 250 OP 250 PS 637: Performed by: INTERNAL MEDICINE

## 2017-11-14 PROCEDURE — 83690 ASSAY OF LIPASE: CPT | Performed by: EMERGENCY MEDICINE

## 2017-11-14 PROCEDURE — 99219 ZZC INITIAL OBSERVATION CARE,LEVL II: CPT | Performed by: INTERNAL MEDICINE

## 2017-11-14 PROCEDURE — 36415 COLL VENOUS BLD VENIPUNCTURE: CPT | Performed by: INTERNAL MEDICINE

## 2017-11-14 PROCEDURE — 96365 THER/PROPH/DIAG IV INF INIT: CPT

## 2017-11-14 PROCEDURE — 25000125 ZZHC RX 250: Performed by: INTERNAL MEDICINE

## 2017-11-14 PROCEDURE — 25000132 ZZH RX MED GY IP 250 OP 250 PS 637: Performed by: EMERGENCY MEDICINE

## 2017-11-14 PROCEDURE — 85025 COMPLETE CBC W/AUTO DIFF WBC: CPT | Performed by: EMERGENCY MEDICINE

## 2017-11-14 PROCEDURE — 96375 TX/PRO/DX INJ NEW DRUG ADDON: CPT

## 2017-11-14 PROCEDURE — 80053 COMPREHEN METABOLIC PANEL: CPT | Performed by: EMERGENCY MEDICINE

## 2017-11-14 PROCEDURE — 80320 DRUG SCREEN QUANTALCOHOLS: CPT | Performed by: EMERGENCY MEDICINE

## 2017-11-14 PROCEDURE — 96376 TX/PRO/DX INJ SAME DRUG ADON: CPT

## 2017-11-14 PROCEDURE — G0378 HOSPITAL OBSERVATION PER HR: HCPCS

## 2017-11-14 RX ORDER — POTASSIUM CHLORIDE 1500 MG/1
20 TABLET, EXTENDED RELEASE ORAL ONCE
Status: COMPLETED | OUTPATIENT
Start: 2017-11-14 | End: 2017-11-14

## 2017-11-14 RX ORDER — POTASSIUM CHLORIDE 29.8 MG/ML
20 INJECTION INTRAVENOUS
Status: DISCONTINUED | OUTPATIENT
Start: 2017-11-14 | End: 2017-11-14

## 2017-11-14 RX ORDER — POTASSIUM CHLORIDE 7.45 MG/ML
10 INJECTION INTRAVENOUS
Status: DISCONTINUED | OUTPATIENT
Start: 2017-11-14 | End: 2017-11-15 | Stop reason: HOSPADM

## 2017-11-14 RX ORDER — LORAZEPAM 2 MG/ML
.5-1 INJECTION INTRAMUSCULAR EVERY 4 HOURS PRN
Status: DISCONTINUED | OUTPATIENT
Start: 2017-11-14 | End: 2017-11-15 | Stop reason: HOSPADM

## 2017-11-14 RX ORDER — PROCHLORPERAZINE 25 MG
25 SUPPOSITORY, RECTAL RECTAL EVERY 12 HOURS PRN
Status: DISCONTINUED | OUTPATIENT
Start: 2017-11-14 | End: 2017-11-15 | Stop reason: HOSPADM

## 2017-11-14 RX ORDER — BISMUTH SUBSALICYLATE 262 MG/1
262-524 TABLET, CHEWABLE ORAL
COMMUNITY

## 2017-11-14 RX ORDER — ACETAMINOPHEN 325 MG/1
650 TABLET ORAL EVERY 4 HOURS PRN
Status: DISCONTINUED | OUTPATIENT
Start: 2017-11-14 | End: 2017-11-15 | Stop reason: HOSPADM

## 2017-11-14 RX ORDER — PROCHLORPERAZINE MALEATE 5 MG
10 TABLET ORAL EVERY 6 HOURS PRN
Status: DISCONTINUED | OUTPATIENT
Start: 2017-11-14 | End: 2017-11-15 | Stop reason: HOSPADM

## 2017-11-14 RX ORDER — POTASSIUM CL/LIDO/0.9 % NACL 10MEQ/0.1L
10 INTRAVENOUS SOLUTION, PIGGYBACK (ML) INTRAVENOUS
Status: DISCONTINUED | OUTPATIENT
Start: 2017-11-14 | End: 2017-11-15 | Stop reason: HOSPADM

## 2017-11-14 RX ORDER — POTASSIUM CHLORIDE 1500 MG/1
20-40 TABLET, EXTENDED RELEASE ORAL
Status: DISCONTINUED | OUTPATIENT
Start: 2017-11-14 | End: 2017-11-15 | Stop reason: HOSPADM

## 2017-11-14 RX ORDER — SODIUM CHLORIDE 9 MG/ML
INJECTION, SOLUTION INTRAVENOUS CONTINUOUS
Status: DISCONTINUED | OUTPATIENT
Start: 2017-11-14 | End: 2017-11-15 | Stop reason: HOSPADM

## 2017-11-14 RX ORDER — LIDOCAINE 40 MG/G
CREAM TOPICAL
Status: DISCONTINUED | OUTPATIENT
Start: 2017-11-14 | End: 2017-11-15 | Stop reason: HOSPADM

## 2017-11-14 RX ORDER — ONDANSETRON 2 MG/ML
4 INJECTION INTRAMUSCULAR; INTRAVENOUS EVERY 30 MIN PRN
Status: DISCONTINUED | OUTPATIENT
Start: 2017-11-14 | End: 2017-11-15 | Stop reason: HOSPADM

## 2017-11-14 RX ORDER — POTASSIUM CL/LIDO/0.9 % NACL 10MEQ/0.1L
10 INTRAVENOUS SOLUTION, PIGGYBACK (ML) INTRAVENOUS ONCE
Status: COMPLETED | OUTPATIENT
Start: 2017-11-14 | End: 2017-11-14

## 2017-11-14 RX ORDER — ACETAMINOPHEN 650 MG/1
650 SUPPOSITORY RECTAL EVERY 4 HOURS PRN
Status: DISCONTINUED | OUTPATIENT
Start: 2017-11-14 | End: 2017-11-15 | Stop reason: HOSPADM

## 2017-11-14 RX ORDER — CALCIUM CARBONATE 500 MG/1
500-1000 TABLET, CHEWABLE ORAL
Status: DISCONTINUED | OUTPATIENT
Start: 2017-11-14 | End: 2017-11-15 | Stop reason: HOSPADM

## 2017-11-14 RX ORDER — NALOXONE HYDROCHLORIDE 0.4 MG/ML
.1-.4 INJECTION, SOLUTION INTRAMUSCULAR; INTRAVENOUS; SUBCUTANEOUS
Status: DISCONTINUED | OUTPATIENT
Start: 2017-11-14 | End: 2017-11-15 | Stop reason: HOSPADM

## 2017-11-14 RX ORDER — ALUMINA, MAGNESIA, AND SIMETHICONE 2400; 2400; 240 MG/30ML; MG/30ML; MG/30ML
30 SUSPENSION ORAL EVERY 4 HOURS PRN
Status: DISCONTINUED | OUTPATIENT
Start: 2017-11-14 | End: 2017-11-15 | Stop reason: HOSPADM

## 2017-11-14 RX ORDER — ONDANSETRON 4 MG/1
4 TABLET, ORALLY DISINTEGRATING ORAL EVERY 6 HOURS PRN
Status: DISCONTINUED | OUTPATIENT
Start: 2017-11-14 | End: 2017-11-15 | Stop reason: HOSPADM

## 2017-11-14 RX ORDER — SODIUM CHLORIDE 9 MG/ML
1000 INJECTION, SOLUTION INTRAVENOUS CONTINUOUS
Status: DISCONTINUED | OUTPATIENT
Start: 2017-11-14 | End: 2017-11-14

## 2017-11-14 RX ORDER — MULTIVITAMIN,THERAPEUTIC
1 TABLET ORAL DAILY
COMMUNITY

## 2017-11-14 RX ORDER — POTASSIUM CHLORIDE 1.5 G/1.58G
20-40 POWDER, FOR SOLUTION ORAL
Status: DISCONTINUED | OUTPATIENT
Start: 2017-11-14 | End: 2017-11-15 | Stop reason: HOSPADM

## 2017-11-14 RX ORDER — ONDANSETRON 2 MG/ML
4 INJECTION INTRAMUSCULAR; INTRAVENOUS EVERY 6 HOURS PRN
Status: DISCONTINUED | OUTPATIENT
Start: 2017-11-14 | End: 2017-11-15 | Stop reason: HOSPADM

## 2017-11-14 RX ADMIN — POTASSIUM CHLORIDE 20 MEQ: 1500 TABLET, EXTENDED RELEASE ORAL at 08:16

## 2017-11-14 RX ADMIN — SODIUM CHLORIDE: 9 INJECTION, SOLUTION INTRAVENOUS at 10:13

## 2017-11-14 RX ADMIN — Medication 10 MEQ: at 17:54

## 2017-11-14 RX ADMIN — Medication 10 MEQ: at 12:01

## 2017-11-14 RX ADMIN — Medication 10 MEQ: at 13:09

## 2017-11-14 RX ADMIN — Medication 10 MEQ: at 15:35

## 2017-11-14 RX ADMIN — SODIUM CHLORIDE 1000 ML: 9 INJECTION, SOLUTION INTRAVENOUS at 08:18

## 2017-11-14 RX ADMIN — POTASSIUM CHLORIDE 20 MEQ: 1500 TABLET, EXTENDED RELEASE ORAL at 23:49

## 2017-11-14 RX ADMIN — Medication 10 MEQ: at 14:16

## 2017-11-14 RX ADMIN — LORAZEPAM 0.5 MG: 2 INJECTION INTRAMUSCULAR; INTRAVENOUS at 17:51

## 2017-11-14 RX ADMIN — RANITIDINE 150 MG: 150 TABLET ORAL at 10:12

## 2017-11-14 RX ADMIN — ONDANSETRON 4 MG: 2 SOLUTION INTRAMUSCULAR; INTRAVENOUS at 18:04

## 2017-11-14 RX ADMIN — Medication 10 MEQ: at 08:16

## 2017-11-14 RX ADMIN — PROCHLORPERAZINE MALEATE 10 MG: 5 TABLET, FILM COATED ORAL at 11:02

## 2017-11-14 RX ADMIN — FLUOXETINE 40 MG: 20 CAPSULE ORAL at 10:12

## 2017-11-14 RX ADMIN — Medication 10 MEQ: at 16:38

## 2017-11-14 RX ADMIN — LIDOCAINE HYDROCHLORIDE 30 ML: 20 SOLUTION ORAL; TOPICAL at 18:07

## 2017-11-14 RX ADMIN — RANITIDINE 150 MG: 150 TABLET ORAL at 20:20

## 2017-11-14 RX ADMIN — POTASSIUM CHLORIDE 40 MEQ: 1500 TABLET, EXTENDED RELEASE ORAL at 21:49

## 2017-11-14 RX ADMIN — SODIUM CHLORIDE: 9 INJECTION, SOLUTION INTRAVENOUS at 23:50

## 2017-11-14 RX ADMIN — ONDANSETRON 4 MG: 2 SOLUTION INTRAMUSCULAR; INTRAVENOUS at 07:26

## 2017-11-14 RX ADMIN — SODIUM CHLORIDE 1000 ML: 9 INJECTION, SOLUTION INTRAVENOUS at 07:26

## 2017-11-14 RX ADMIN — CALCIUM CARBONATE (ANTACID) CHEW TAB 500 MG 1000 MG: 500 CHEW TAB at 10:11

## 2017-11-14 ASSESSMENT — ENCOUNTER SYMPTOMS
APPETITE CHANGE: 1
FREQUENCY: 0
CONSTIPATION: 1
HEMATURIA: 0
DYSURIA: 0
VOMITING: 1
NAUSEA: 1
FEVER: 0
ABDOMINAL PAIN: 1

## 2017-11-14 NOTE — ED NOTES
"Pt. States she was treated here Saturday morning after being \"on a burgos\" pt. Was sober for 90 day, between Wednesday and Friday of last week. Today pt. Presents with continued nausea and vomiting, denies fever, has not been able to keep anything down. Pt. Reports RLQ tenderness but also has her period, so unsure if pain is related to presenting problem. Pt. States she has been taking Zofran 8mg 3x a day for last 2 days, Lorazepam 1mg 3x a day for last 2 days, along with Pepto and Alkaseltzer without relief of nausea/vomiting.  "

## 2017-11-14 NOTE — IP AVS SNAPSHOT
[facility]    [address] [place] [address]    Phone:  [phone]                                       After Visit Summary   11/14/2017    Riya Ugalde    MRN: [id number]           After Visit Summary Signature Page     I have received my discharge instructions, and my questions have been answered. I have discussed any challenges I see with this plan with the nurse or doctor.    ..........................................................................................................................................  Patient/Patient Representative Signature      ..........................................................................................................................................  Patient Representative Print Name and Relationship to Patient    ..................................................               ................................................  Date                                            Time    ..........................................................................................................................................  Reviewed by Signature/Title    ...................................................              ..............................................  Date                                                            Time

## 2017-11-14 NOTE — LETTER
[facility]  [address] [place] [address]    11/15/17    Riya Ugalde  [address] [facility] [address]    :  1975    TO WHOM IT MAY CONCERN:    Riya was hospitalized from 2017 through today for medical illness.     Please excuse her from school/work.    Anticipate return to school/work tomorrow. Patient is not contagious.    Please contact me if there are any questions or concerns.      Sincerely,    Hermilo Land PA-C  [facility]    CC  Patient Care Team:  Verenice Goetz as PCP - General (Internal Medicine)  Bakari, Paz Brenal MD as MD (OB/Gyn)

## 2017-11-14 NOTE — IP AVS SNAPSHOT
MRN:[id number]                      After Visit Summary   11/14/2017    Riya Ugalde    MRN: [id number]           Thank you!     Thank you for choosing [facility] for your care. Our goal is always to provide you with excellent care. Hearing back from our patients is one way we can continue to improve our services. Please take a few minutes to complete the written survey that you may receive in the mail after you visit with us. Thank you!        Patient Information     Date Of Birth          1975        About your hospital stay     You were admitted on:  November 14, 2017 [facility]    You were discharged on:  November 15, 2017        Reason for your hospital stay       Continue to slowly advance diet as abdominal discomfort allows. Continue to refrain from consuming alcohol, strongly encouraged to discuss with outpatient therapist and mentors regarding ongoing sobriety.                  Who to Call     For medical emergencies, please call 911.  For non-urgent questions about your medical care, please call your primary care provider or clinic, [phone]          Attending Provider     Provider Specialty    Valeria Randolph MD Emergency Medicine    [facility]el encarnacion MD Internal Medicine       Primary Care Provider Office Phone # Fax #    Verenicemeghan Goetz [phone]      After Care Instructions     Activity       Your activity upon discharge: activity as tolerated            Diet       Follow this diet upon discharge: Orders Placed This Encounter      Regular Diet Adult                  Follow-up Appointments     Follow-up and recommended labs and tests        Follow up with primary therapist, within 7 days for hospital follow- up.                  Pending Results     No orders found for last 3 day(s).            Statement of Approval     Ordered          11/15/17 1234  I have reviewed and agree with all the recommendations and  "orders detailed in this document.  EFFECTIVE NOW     Approved and electronically signed by:  Hermilo Land PA-C             Admission Information     Date & Time Provider Department Dept. Phone    2017 Russel Leyva MD [facility] [phone]      Your Vitals Were     Blood Pressure Pulse Temperature Respirations Height Weight    154/91 (BP Location: Right arm) 68 98  F (36.7  C) (Oral) 16 1.651 m (5' 5\") 70.9 kg (156 lb 4.9 oz)    Pulse Oximetry BMI (Body Mass Index)                99% 26.01 kg/m2          MyChart Information     [employer] lets you send messages to your doctor, view your test results, renew your prescriptions, schedule appointments and more. To sign up, go to [email or web address] . Click on \"Log in\" on the left side of the screen, which will take you to the Welcome page. Then click on \"Sign up Now\" on the right side of the page.     You will be asked to enter the access code listed below, as well as some personal information. Please follow the directions to create your username and password.     Your access code is: [email or web address]  Expires: 2018  1:07 PM     Your access code will  in 90 days. If you need help or a new code, please call your [facility] clinic or [phone].        Care EveryWhere ID     This is your Care EveryWhere ID. This could be used by other organizations to access your [facility] medical records  [id number]        Equal Access to Services     [facility] AH: Hadii deirdre ku hadasho Soomaali, waaxda luqadaha, qaybta kaalmada adeegyada, bruce lowery. So [facility] [phone].    ATENCIÓN: Si habla español, tiene a krishna disposición servicios gratuitos de asistencia lingüística. Llame al [phone].    We comply with applicable federal civil rights laws and [place] laws. We do not discriminate on the basis of race, color, national origin, age, disability, sex, sexual orientation, or gender identity.               Review of " your medicines      CONTINUE these medicines which have NOT CHANGED        Dose / Directions    WALDEMAR-SELTZER ANTACID PO        Take by mouth 4 times daily as needed   Refills:  0       bismuth subsalicylate 262 MG chewable tablet   Commonly known as:  PEPTO BISMOL        Dose:  262-524 mg   Take 262-524 mg by mouth 4 times daily (before meals and nightly)   Refills:  0       LORAZEPAM PO        Dose:  1 mg   Take 1 mg by mouth every 8 hours as needed   Refills:  0       multivitamin, therapeutic Tabs tablet        Dose:  1 tablet   Take 1 tablet by mouth daily   Refills:  0       PROZAC PO        Dose:  40 mg   Take 40 mg by mouth daily   Refills:  0       ZOFRAN PO        Dose:  8 mg   Take 8 mg by mouth every 8 hours as needed for nausea or vomiting   Refills:  0                Protect others around you: Learn how to safely use, store and throw away your medicines at www.disposemymeds.org.             Medication List: This is a list of all your medications and when to take them. Check marks below indicate your daily home schedule. Keep this list as a reference.      Medications           Morning Afternoon Evening Bedtime As Needed    WALDEMAR-SELTZER ANTACID PO   Take by mouth 4 times daily as needed                                   bismuth subsalicylate 262 MG chewable tablet   Commonly known as:  PEPTO BISMOL   Take 262-524 mg by mouth 4 times daily (before meals and nightly)                                            LORAZEPAM PO   Take 1 mg by mouth every 8 hours as needed                                   multivitamin, therapeutic Tabs tablet   Take 1 tablet by mouth daily                                   PROZAC PO   Take 40 mg by mouth daily   Last time this was given:  40 mg on 11/15/2017  9:59 AM                                   ZOFRAN PO   Take 8 mg by mouth every 8 hours as needed for nausea or vomiting

## 2017-11-14 NOTE — ED PROVIDER NOTES
History     Chief Complaint:  Nausea and Vomiting     HPI   Riya Ugalde is a 42 year old female who presents accompanied by her  for evaluation of nausea and vomiting. The patient has a longstanding history of alcohol abuse and had previously been sober for several months before she started drinking again approximately a week ago. On 11/11/2017, the patient was seen in the ED with complaints of nausea and vomiting. At that time, the patient had labs drawn as below, and her symptoms improved with IV fluids and IV Zofran. She was discharged with oral Zofran. Since then, the patient reports that she has stopped drinking alcohol, but her nausea and vomiting has remained persistent despite taking Zofran as well as Ativan. She has had a reduced appetite and fluid intake, and she has had decrease urine output and constipation with her last bowel movement on 11/9, but she has continued to pass gas. She has been vomiting approximately 10 times each day. She additionally reports that she has developed very mild right lower quadrant abdominal pain. Due to her persistent nausea and vomiting, the patient presented to the ED for reevaluation. Otherwise, she has not had any fever, dysuria, hematuria, or urinary frequency in association with her recent symptoms.     Labs 11/11/2017:  CBC: WBC 8.6, HGB 15.4,   CMP: Glucose 217, Creatinine 0.59, Bilirubin total 1.4  Lipase: 79     Allergies:  NKDA      Medications:    LORAZEPAM PO  ondansetron (ZOFRAN ODT) 4 MG ODT tab  sucralfate (CARAFATE) 1 GM/10ML suspension  Potassium Chloride ER 20 MEQ TBCR  FLUoxetine HCl (PROZAC PO)    Past Medical History:    Genital herpes  Anxiety     Past Surgical History:    Cholecystectomy  Dilation and curettage suction     Family History:    History reviewed. No pertinent family history.     Social History:  Tobacco use:    Former smoker  Alcohol use:    History of alcohol abuse, recent relapse, quit again 11/11/2017  Marital  "status:       Accompanied to ED by:  Rik     Review of Systems   Constitutional: Positive for appetite change (reduced). Negative for fever.   Gastrointestinal: Positive for abdominal pain, constipation, nausea and vomiting.   Genitourinary: Positive for decreased urine volume. Negative for dysuria, frequency and hematuria.   All other systems reviewed and are negative.    Physical Exam   First Vitals:  BP: 122/83  Pulse: 102  Temp: 98.1  F (36.7  C)  Resp: 18  Height: 165.1 cm (5' 5\")  Weight: 68 kg (150 lb)  SpO2: 92 %      Physical Exam  Nursing note and vitals reviewed.  Constitutional:  Appears well-developed and well-nourished, comfortable.   HENT:   Head:   No evidence of facial or scalp trauma.  Nose:    Nose normal.   Mouth/Throat:  Mucosa is moist.  Eyes:    Conjunctivae are normal.      Pupils are equal, round, and reactive to light.      Right eye exhibits no discharge. Left eye exhibits no discharge.      No scleral icterus.   Cardiovascular:  Normal rate, regular rhythm.      Normal heart sounds and intact distal pulses.       No murmur heard.  Pulmonary/Chest:  Effort normal and breath sounds normal. No respiratory distress.     No wheezes. No rales. No chest wall tenderness. No stridor.   Abdominal:   Soft. No distension and no mass.      Mild right lower quadrant tenderness without rebound or guarding.      No flank pain.  Musculoskeletal:  Normal range of motion.      No edema and no tenderness.                                       Neck supple, no midline cervical tenderness.   Neurological:   Awake and alert.      No focal weakness.   Skin:    Skin is warm and dry. No rash noted. No diaphoresis.      No erythema. No pallor.   Psychiatric:   Behavior is normal. Judgment and thought content normal.     Emergency Department Course     Laboratory:  CBC: WNL (WBC 10.6, HGB 15.4, )  CMP:  low, Potassium 2.1 low, Chloride 87 low, Carbon dioxide 35 high, Glucose 115 high, " Bilirubin total 1.5 high, o/w WNL (Creatinine 0.66)  Magnesium: 2.1  Lipase: 166  Alcohol level blood: <0.01  HCG Qualitative Pregnancy Blood: Negative       Interventions:  0726 NS 1,000 mL IV  0726 Zofran 4 mg IV   0816 Potassium chloride 10 mEq IV  0816 Potassium chloride SA 20 mEq PO  0818 NS 1,000 mL IV    Emergency Department Course:  Nursing notes and vitals reviewed.  0702: I performed an exam of the patient as documented above.     0745: I updated and reassessed the patient.     0812: I updated and reassessed the patient.     0826: I spoke with Dr. Leyva of the hospitalist service regarding patient's presentation, findings, and plan of care.     Findings and plan explained to the Patient and spouse who consents to admission. Discussed the patient with Dr. Leyva, who will admit the patient to an obs tele bed for further monitoring, evaluation, and treatment.     Impression & Plan      Medical Decision Making:  Patient comes in with continued nausea and vomiting over the last 3 days. The Zofran at home has not been helping. Labs were drawn and she was given a liter of normal saline and IV Zofran. Her nausea is gone she is feeling better. However, her labs came back showing an abnormal potassium of 2.1 and a mildly elevated bilirubin of 1.5. This is likely secondary to dehydration and vomiting. Alcohol level is negative, magnesium levels normal. She was given IV potassium 10 mEq and I ordered 20 mEq of oral potassium to see if she can keep this down. Her potassium is low enough that I would like to put her in today for further hydration and to get her potassium replaced. I talked to Dr. Leyva and will admit her on observation for fluid rehydration and potassium replacement. She had some slight lower abdominal tenderness but no fevers and a normal white count. With minimal tenderness on physical exam I did not feel she needs advanced imaging at this time, but if she were to spike a fever or develop  worsening pain, CT of the abdomen and pelvis could be considered.    Diagnosis:    ICD-10-CM   1. Hypokalemia E87.6   2. Intractable vomiting with nausea, unspecified vomiting type R11.2   3. Dehydration E86.0   4. Hyperbilirubinemia E80.6       Disposition:  IV fluid rehydration and potassium replacement. Consider abdominal imaging if she develops fevers or significant abdominal pain.      ICarlos, am serving as a scribe at 7:02 AM on 11/14/2017 to document services personally performed by Dr. Randolph, based on my observations and the provider's statements to me.     EMERGENCY DEPARTMENT       Valeria Randolph MD  11/14/17 0976

## 2017-11-14 NOTE — PHARMACY-ADMISSION MEDICATION HISTORY
Admission medication history interview status for the 11/14/2017  admission is complete. See EPIC admission navigator for prior to admission medications     Medication history source reliability:Good    Actions taken by pharmacist (provider contacted, etc):None     Additional medication history information not noted on PTA med list :None    Medication reconciliation/reorder completed by provider prior to medication history? No    Time spent in this activity: 10 minutes    Prior to Admission medications    Medication Sig Last Dose Taking? Auth Provider   LORAZEPAM PO Take 1 mg by mouth every 8 hours as needed  11/13/2017 at pm Yes Reported, Patient   Ondansetron HCl (ZOFRAN PO) Take 8 mg by mouth every 8 hours as needed for nausea or vomiting 11/13/2017 at pm Yes Unknown, Entered By History   multivitamin, therapeutic (THERA-VIT) TABS tablet Take 1 tablet by mouth daily Past Week at Unknown time Yes Unknown, Entered By History   Calcium Carbonate Antacid (WALDEMAR-SELTZER ANTACID PO) Take by mouth 4 times daily as needed 11/13/2017 at Unknown time Yes Unknown, Entered By History   bismuth subsalicylate (PEPTO BISMOL) 262 MG chewable tablet Take 262-524 mg by mouth 4 times daily (before meals and nightly) 11/13/2017 at Unknown time Yes Unknown, Entered By History   FLUoxetine HCl (PROZAC PO) Take 40 mg by mouth daily  Past Week at Unknown time Yes Reported, Patient

## 2017-11-14 NOTE — PROGRESS NOTES
The following criteria to be met before discharge:    1.   - Nausea/vomiting (diarrhea if present) improved - not met  2.   - Tolerating oral intake to maintain hydration - not met  3.   - Vital signs normal or at patient baseline and                   orthostatic vitals are normal and patient not lightheaded with standing  - met  4.   - Diagnostic tests and consults completed and resulted if ordered - not met  5.   - Safe disposition plan has been identified - not met    November 14, 2017

## 2017-11-14 NOTE — ED NOTES
"[facility]  ED Nurse Handoff Report    ED Chief complaint: Vomiting (states has been vomiting since friday was seen here)      ED Diagnosis:   Final diagnoses:   Hypokalemia   Intractable vomiting with nausea, unspecified vomiting type   Dehydration   Hyperbilirubinemia       Code Status: Full Code    Allergies: No Known Allergies    Activity level - Baseline/Home:  Independent    Activity Level - Current:   Independent     Needed?: No    Isolation: No  Infection: Not Applicable    Bariatric?: No    Vital Signs:   Vitals:    11/14/17 0647 11/14/17 0727 11/14/17 0730   BP: 122/83     Pulse: 102 72    Resp: 18     Temp: 98.1  F (36.7  C)     TempSrc: Oral     SpO2: 92% 97% 95%   Weight: 68 kg (150 lb)     Height: 1.651 m (5' 5\")         Cardiac Rhythm: ,        Pain level:      Is this patient confused?: No    Patient Report: Initial Complaint: N/V  Focused Assessment: Reports to have N/V since Sat.  Had gone on a drinking \"bedner\" between Wed. And Friday last week.  Given zofran and fluid bolus in ED.  K+  Was 2.1.  Given oral and IV replacement. Able to keep down ice chips and water.  A/O x4.   Tests Performed: blood work  Abnormal Results: K+  Treatments provided: NS bolus, 4mg zofran, 10MEQ IV K+, 20 MEQ K+ PO    Family Comments: none    OBS brochure/video discussed/provided to patient: Yes    ED Medications:   Medications   0.9% sodium chloride BOLUS (0 mLs Intravenous Stopped 11/14/17 0818)     Followed by   0.9% sodium chloride infusion (1,000 mLs Intravenous New Bag 11/14/17 0818)   ondansetron (ZOFRAN) injection 4 mg (4 mg Intravenous Given 11/14/17 0726)   potassium chloride 10 mEq in 100 mL intermittent infusion with 10 mg lidocaine (10 mEq Intravenous New Bag 11/14/17 0816)   potassium chloride SA (K-DUR/KLOR-CON M) CR tablet 20 mEq (20 mEq Oral Given 11/14/17 0816)       Drips infusing?:  Yes      ED NURSE PHONE NUMBER: *[phone]         "

## 2017-11-14 NOTE — H&P
[facility]    History and Physical  Hospitalist       Date of Admission:  11/14/2017    Assessment & Plan   Riya Ugalde is a 42 year old female with history of alcohol dependence with recent alcohol binge, who presents with intractable nausea and vomiting. Registered to observation 11/14/2017 for further treatment of hypokalemia.     Intractable nausea and vomiting  Hypokalemic hypochloremic metabolic alkalosis  Hypovolemic hyponatremia   Presents with intractable nausea and vomiting, likely secondary to recent alcohol binge. Associated with metabolic alkalosis, significant hypokalemia and hyponatremia. Gastroenteritis less likely without associated diarrhea. Associated with occasional lower abdominal pain, however she can not differentiate this from cramping secondary to menstruation. No abdominal pain or tenderness at this time. Total bilirubin minimally elevated, otherwise LFTs and lipase unremarkable and she has had a previous cholecystectomy.   - Continue IV fluids  - PRN anti-emetics   - Potassium replacement protocol   - Advance diet as tolerated   - Ranitidine ordered, PRN Maalox     Alcohol abuse with dependence  Reports 90 days of sobriety prior to her recent binge.  She attributes her binge to worsening depressive symptoms, feels improved now.   - Reports that she is well connected to an addiction therapist in the outpatient setting, denies any need for chemical dependency resources at this time and she is motivated to maintain sobriety. Will defer chemical dependency / psychiatry consult at this time.     Anxiety  Depression  - Continue prior to admission fluoxetine     DVT Prophylaxis: Low Risk/Ambulatory with no VTE prophylaxis indicated  Code Status: Full Code    Disposition: [place] to observation status. Anticipate discharge within 24 hours if clinically improved.     Russel Leyva    Primary Care Physician   TREVON NEGRON    Chief Complaint   Nausea, vomiting for 4  days    History is obtained from the patient    History of Present Illness   Riya Ugalde is a 42 year old female who presents with the above chief complaint.    The patient reports she was recently sober for 90 days. Late last week she again started drinking, she reports due to increased feelings of depression. She drank 1 bottle of vodka daily for 3 days, and subsequently developed intractable nausea and vomiting. She presented with these symptoms to the Emergency Department 11/11/17, was managed symptomatically with improvement and sent home. She continued to have nausea with approximately 10 episodes of vomiting daily.  Her vomiting consisted of whatever she tried to previously consumed, denies any bilious quality or blood.  She has not had any diarrhea.  Associated with this she has had some mild lower quadrant abdominal pain although she is also menstruating and she is unclear if her discomfort is related to cramping from that. She has essentially had no meaningful intake in the past 3-4 days. She feels generally weak. Denies any palpitations.     In the Emergency Department, the patient was seen by Dr. Valeria Randolph, with whom I discussed the patient's presenting symptoms and emergency department course.  Initial vital signs were a temperature of 98.1F, , /83, RR 18, SpO2 92% on room air. Work-up notable for a sodium of 132, potassium of 2.1, bicarbonate of 35, total bilirubin of 1.5. She received IV fluids, anti-emetics, oral and IV potassium, and hospitalists were contacted for admission to the hospital.    Past Medical History   I have reviewed this patient's medical history and updated it with pertinent information if needed.     Depression  Anxiety  Alcohol dependence   Genital herpes    Past Surgical History   I have reviewed this patient's surgical history and updated it with pertinent information if needed.  Past Surgical History:   Procedure Laterality Date     CHOLECYSTECTOMY        DILATION AND CURETTAGE SUCTION  6/2/2014    Procedure: DILATION AND CURETTAGE SUCTION;  Surgeon: Moira Carlin MD;  Location: [facility]       Prior to Admission Medications   Prior to Admission Medications   Prescriptions Last Dose Informant Patient Reported? Taking?   Calcium Carbonate Antacid (WALDEMAR-SELTZER ANTACID PO) 11/13/2017 at Unknown time Self Yes Yes   Sig: Take by mouth 4 times daily as needed   FLUoxetine HCl (PROZAC PO) Past Week at Unknown time Self Yes Yes   Sig: Take 40 mg by mouth daily    LORAZEPAM PO 11/13/2017 at pm Self Yes Yes   Sig: Take 1 mg by mouth every 8 hours as needed    Ondansetron HCl (ZOFRAN PO) 11/13/2017 at pm Self Yes Yes   Sig: Take 8 mg by mouth every 8 hours as needed for nausea or vomiting   bismuth subsalicylate (PEPTO BISMOL) 262 MG chewable tablet 11/13/2017 at Unknown time Self Yes Yes   Sig: Take 262-524 mg by mouth 4 times daily (before meals and nightly)   multivitamin, therapeutic (THERA-VIT) TABS tablet Past Week at Unknown time Self Yes Yes   Sig: Take 1 tablet by mouth daily      Facility-Administered Medications: None     Allergies   No Known Allergies    Social History   Non smoker. Former daily alcohol use, abstinent for 90 days prior to recent binge detailed in HPI. No illicit or IV drug use    Works in advertising     Family History   I have reviewed this patient's family history and updated it with pertinent information if needed.     Father had prostate cancer.      Review of Systems   The 10 point Review of Systems is negative other than noted in the HPI or here.     Physical Exam   Temp: 98.1  F (36.7  C) Temp src: Oral BP: 132/90 Pulse: 72   Resp: 18 SpO2: 100 %      Vital Signs with Ranges  Temp:  [98.1  F (36.7  C)] 98.1  F (36.7  C)  Pulse:  [] 72  Resp:  [18] 18  BP: (122-132)/(83-90) 132/90  SpO2:  [92 %-100 %] 100 %  150 lbs 0 oz    Constitutional: Well-appearing, NAD  Eyes: PERRL, EOMI  HENT: Oropharynx clear, MMM  Respiratory: Clear to  auscultation bilaterally, good air movement, normal effort   Cardiovascular: RRR, no m/r/g. No peripheral edema.  GI: Soft, non-tender, non-distended. No rebound tenderness or guarding. BS present.   Skin: Warm, dry, no apparent rashes  Musculoskeletal: No joint tenderness, erythema, swelling  Neurologic: Alert. Responding to questions appropriately. Following commands.   Psychiatric: Normal affect, appropriate    Data   Data reviewed today:  I personally reviewed no images or EKG's today.    Recent Labs  Lab 11/14/17  0726 11/11/17  0214   WBC 10.6 8.6   HGB 15.4 15.4   MCV 87 88    385   * 134   POTASSIUM 2.1* 3.5   CHLORIDE 87* 98   CO2 35* 22   BUN 26 22   CR 0.66 0.59   ANIONGAP 10 14   LISANDRO 9.1 9.0   * 217*   ALBUMIN 3.7 4.2   PROTTOTAL 7.5 8.3   BILITOTAL 1.5* 1.4*   ALKPHOS 84 121   ALT 38 48   AST 25 23   LIPASE 166 79       No results found for this or any previous visit (from the past 24 hour(s)).

## 2017-11-15 VITALS
HEART RATE: 68 BPM | HEIGHT: 65 IN | TEMPERATURE: 98 F | OXYGEN SATURATION: 99 % | SYSTOLIC BLOOD PRESSURE: 154 MMHG | DIASTOLIC BLOOD PRESSURE: 91 MMHG | WEIGHT: 156.31 LBS | BODY MASS INDEX: 26.04 KG/M2 | RESPIRATION RATE: 16 BRPM

## 2017-11-15 LAB
ANION GAP SERPL CALCULATED.3IONS-SCNC: 7 MMOL/L (ref 3–14)
BUN SERPL-MCNC: 15 MG/DL (ref 7–30)
CALCIUM SERPL-MCNC: 7.9 MG/DL (ref 8.5–10.1)
CHLORIDE SERPL-SCNC: 102 MMOL/L (ref 94–109)
CO2 SERPL-SCNC: 26 MMOL/L (ref 20–32)
CREAT SERPL-MCNC: 0.56 MG/DL (ref 0.52–1.04)
GFR SERPL CREATININE-BSD FRML MDRD: >90 ML/MIN/1.7M2
GLUCOSE SERPL-MCNC: 91 MG/DL (ref 70–99)
POTASSIUM SERPL-SCNC: 3.4 MMOL/L (ref 3.4–5.3)
SODIUM SERPL-SCNC: 135 MMOL/L (ref 133–144)

## 2017-11-15 PROCEDURE — 25000128 H RX IP 250 OP 636: Performed by: INTERNAL MEDICINE

## 2017-11-15 PROCEDURE — 99217 ZZC OBSERVATION CARE DISCHARGE: CPT | Performed by: PHYSICIAN ASSISTANT

## 2017-11-15 PROCEDURE — 36415 COLL VENOUS BLD VENIPUNCTURE: CPT | Performed by: INTERNAL MEDICINE

## 2017-11-15 PROCEDURE — G0378 HOSPITAL OBSERVATION PER HR: HCPCS

## 2017-11-15 PROCEDURE — 25000132 ZZH RX MED GY IP 250 OP 250 PS 637: Performed by: INTERNAL MEDICINE

## 2017-11-15 PROCEDURE — 80048 BASIC METABOLIC PNL TOTAL CA: CPT | Performed by: INTERNAL MEDICINE

## 2017-11-15 RX ADMIN — CALCIUM CARBONATE (ANTACID) CHEW TAB 500 MG 1000 MG: 500 CHEW TAB at 13:14

## 2017-11-15 RX ADMIN — SODIUM CHLORIDE: 9 INJECTION, SOLUTION INTRAVENOUS at 07:37

## 2017-11-15 RX ADMIN — RANITIDINE 150 MG: 150 TABLET ORAL at 10:00

## 2017-11-15 RX ADMIN — FLUOXETINE 40 MG: 20 CAPSULE ORAL at 09:59

## 2017-11-15 NOTE — PROGRESS NOTES
The following criteria to be met before discharge:     1.   - Nausea/vomiting (diarrhea if present) improved - not met  2.   - Tolerating oral intake to maintain hydration - not met  3.   - Vital signs normal or at patient baseline and                   orthostatic vitals are normal and patient not lightheaded with standing  - not met  4.   - Diagnostic tests and consults completed and resulted if ordered - met  5.   - Safe disposition plan has been identified - met

## 2017-11-15 NOTE — PLAN OF CARE
Nausea/vomiting (diarrhea if present) improved. -yes  - Tolerating oral intake to maintain hydration -yes  - Vital signs normal or at patient baseline and orthostatic vitals are normal and patient not lightheaded with standing -yes  - Diagnostic tests and consults completed and resulted if ordered -yes  - Safe disposition plan has been identified -yes    IV infusing. Tolerating small amount of regular diet. Tums administered for mild acid reflux. Denies nausea. Ambulating ind. Potassium 2.1 -->3.4 after replacement. Tele- SR.

## 2017-11-15 NOTE — DISCHARGE SUMMARY
[facility]    Discharge Summary  Hospitalist    Date of Admission:  11/14/2017  Date of Discharge:  11/15/2017  Discharging Provider: Hermilo Land PA-C    Discharge Diagnoses      Hypokalemia  Intractable vomiting with nausea, unspecified vomiting type  Dehydration  Hyperbilirubinemia    History of Present Illness   Riya Ugalde is an 42 year old female who presented with nausea and vomiting in setting of recent alcohol use.    HPI from admission H&P:  Riya Ugalde is a 42 year old female who presents with the above chief complaint.     The patient reports she was recently sober for 90 days. Late last week she again started drinking, she reports due to increased feelings of depression. She drank 1 bottle of vodka daily for 3 days, and subsequently developed intractable nausea and vomiting. She presented with these symptoms to the Emergency Department 11/11/17, was managed symptomatically with improvement and sent home. She continued to have nausea with approximately 10 episodes of vomiting daily.  Her vomiting consisted of whatever she tried to previously consumed, denies any bilious quality or blood.  She has not had any diarrhea.  Associated with this she has had some mild lower quadrant abdominal pain although she is also menstruating and she is unclear if her discomfort is related to cramping from that. She has essentially had no meaningful intake in the past 3-4 days. She feels generally weak. Denies any palpitations.      In the Emergency Department, the patient was seen by Dr. Valeria Randolph, with whom I discussed the patient's presenting symptoms and emergency department course.  Initial vital signs were a temperature of 98.1F, , /83, RR 18, SpO2 92% on room air. Work-up notable for a sodium of 132, potassium of 2.1, bicarbonate of 35, total bilirubin of 1.5. She received IV fluids, anti-emetics, oral and IV potassium, and hospitalists were contacted for admission to  the hospital.    Hospital Course   Riya Ugalde was admitted on 11/14/2017.  The following problems were addressed during her hospitalization:    Intractable nausea and vomiting, resolved  Hypokalemic hypochloremic metabolic alkalosis, resolved  Hypovolemic hyponatremia, resolved   Presented with intractable nausea and vomiting, likely secondary to recent alcohol binge. Associated with metabolic alkalosis, significant hypokalemia and hyponatremia. Gastroenteritis less likely without associated diarrhea. Associated with occasional lower abdominal pain, however she can not differentiate this from cramping secondary to menstruation. No abdominal pain or tenderness at this time. Total bilirubin minimally elevated, otherwise LFTs and lipase unremarkable and she has had a previous cholecystectomy. She was admitted under observation status and provided supportive management with IV fluids and antiemetics. Her significant hypokalemia (value 2.1) was corrected with replacement. By the morning following admission, patient's symptoms had resolved and she was able to tolerate oral intake without nausea or vomiting. Her laboratory abnormalities resolved and she requested discharge. She was discharged home in stable condition with strong recommendation to pursue sobriety. See below for further outpatient follow-up.     Alcohol abuse with dependence  Reports 90 days of sobriety prior to her recent binge.  She attributes her binge to worsening depressive symptoms, feels improved now. Reports that she is well connected to an addiction therapist in the outpatient setting, denies any need for chemical dependency resources at this time and she is motivated to maintain sobriety. Will defer chemical dependency / psychiatry consult at this time.      Anxiety  Depression  Continue prior to admission fluoxetine.     Hermilo Land PA-C    Significant Results and Procedures   As noted below    Pending Results   These results will be  followed up by n/a  Unresulted Labs Ordered in the Past 30 Days of this Admission     No orders found for last 61 day(s).          Code Status   Full Code       Primary Care Physician   TREVON NEGRON    Physical Exam   Temp: 98  F (36.7  C) Temp src: Oral BP: (!) 154/91 Pulse: 68   Resp: 16 SpO2: 99 % O2 Device: None (Room air)    Vitals:    11/14/17 0647 11/14/17 0929 11/15/17 0500   Weight: 68 kg (150 lb) 68.5 kg (151 lb 0.2 oz) 70.9 kg (156 lb 4.9 oz)     Vital Signs with Ranges  Temp:  [98  F (36.7  C)-99.7  F (37.6  C)] 98  F (36.7  C)  Pulse:  [68-88] 68  Resp:  [16] 16  BP: (141-168)/() 154/91  SpO2:  [98 %-99 %] 99 %  I/O last 3 completed shifts:  In: 4038 [P.O.:1680; I.V.:1358; IV Piggyback:1000]  Out: 800 [Emesis/NG output:800]    GEN: well-developed, well-nourished, appears comfortable  PULM: lungs CTA bilaterally, no increased work of breathing, no wheeze, rales, rhonchi  CV: RRR, S1 & S2, no murmur  GI: soft, nontender, nondistended, no guarding or rigidity, +BS in all 4 quadrants  SKIN: warm & dry without rash, wound, or pedal edema    Discharge Disposition   Discharged to home  Condition at discharge: Stable    Consultations This Hospital Stay   None    Time Spent on this Encounter   I, Hermilo Land, personally saw the patient today and spent less than or equal to 30 minutes discharging this patient.    Discharge Orders     Reason for your hospital stay   Continue to slowly advance diet as abdominal discomfort allows. Continue to refrain from consuming alcohol, strongly encouraged to discuss with outpatient therapist and mentors regarding ongoing sobriety.     Follow-up and recommended labs and tests    Follow up with primary therapist, within 7 days for hospital follow- up.     Activity   Your activity upon discharge: activity as tolerated     Diet   Follow this diet upon discharge: Orders Placed This Encounter     Regular Diet Adult       Discharge Medications   Current Discharge Medication  List      CONTINUE these medications which have NOT CHANGED    Details   LORAZEPAM PO Take 1 mg by mouth every 8 hours as needed       Ondansetron HCl (ZOFRAN PO) Take 8 mg by mouth every 8 hours as needed for nausea or vomiting      multivitamin, therapeutic (THERA-VIT) TABS tablet Take 1 tablet by mouth daily      Calcium Carbonate Antacid (WALDEMAR-SELTZER ANTACID PO) Take by mouth 4 times daily as needed      bismuth subsalicylate (PEPTO BISMOL) 262 MG chewable tablet Take 262-524 mg by mouth 4 times daily (before meals and nightly)      FLUoxetine HCl (PROZAC PO) Take 40 mg by mouth daily            Allergies   No Known Allergies  Data   Most Recent 3 CBC's:  Recent Labs   Lab Test  11/14/17   0726 11/11/17 0214 08/27/17   1745   WBC  10.6  8.6  7.9   HGB  15.4  15.4  15.0   MCV  87  88  90   PLT  330  385  273      Most Recent 3 BMP's:  Recent Labs   Lab Test  11/15/17   0620  11/14/17   2103  11/14/17   1116  11/14/17   0726 11/11/17 0214   NA  135   --    --   132*  134   POTASSIUM  3.4  3.0*  2.2*  2.1*  3.5   CHLORIDE  102   --    --   87*  98   CO2  26   --    --   35*  22   BUN  15   --    --   26  22   CR  0.56   --    --   0.66  0.59   ANIONGAP  7   --    --   10  14   LISANDRO  7.9*   --    --   9.1  9.0   GLC  91   --    --   115*  217*     Most Recent 2 LFT's:  Recent Labs   Lab Test  11/14/17   0726 11/11/17 0214   AST  25  23   ALT  38  48   ALKPHOS  84  121   BILITOTAL  1.5*  1.4*     Most Recent INR's and Anticoagulation Dosing History:  Anticoagulation Dose History     There is no flowsheet data to display.        Most Recent 3 Troponin's:No lab results found.  Most Recent Cholesterol Panel:No lab results found.  Most Recent 6 Bacteria Isolates From Any Culture (See EPIC Reports for Culture Details):No lab results found.  Most Recent TSH, T4 and A1c Labs:  Recent Labs   Lab Test  01/01/16   0408   A1C  4.6     Results for orders placed or performed during the hospital encounter of 01/01/16    Abdomen US, limited (RUQ only)    Narrative    LIMITED ABDOMINAL (RIGHT UPPER QUADRANT) ULTRASOUND  1/1/2016 8:14 AM    HISTORY: Vomiting. Cholecystectomy in 2009.    COMPARISON: None.    FINDINGS: The liver is at the upper limits of normal in size. It  demonstrates moderate diffuse increased echogenicity without focal  lesions. The gallbladder is surgically absent. The common bile duct is  normal measuring 0.48 cm in diameter. The visualized portion of the  pancreas is normal. The right kidney is normal with no hydronephrosis  or abnormal mass.      Impression    IMPRESSION:   1. Cholecystectomy.  2. Moderate diffuse fatty infiltration of the liver.        JEFFREY BRIONES MD

## 2017-11-15 NOTE — DISCHARGE SUMMARY
Patient has been discharged to home. Home medication regimen discussed with patient and patient verbalizes understanding. Diet and activity and wound care discussed with patient and patient verbalizes understanding.  Upcoming appointments also discussed. Patient had no questions.

## 2017-11-15 NOTE — PLAN OF CARE
Problem: Patient Care Overview  Goal: Individualization & Mutuality  Outcome: Improving  Observation Goals  - Nausea/vomiting (diarrhea if present) improved. -yes  - Tolerating oral intake to maintain hydration -yes  - Vital signs normal or at patient baseline and orthostatic vitals are normal and patient not lightheaded with standing -no  - Diagnostic tests and consults completed and resulted if ordered -yes  - Safe disposition plan has been identified -no  Pt A&O, pt slightly hypertensive, pt also stating that nausea better.  Pt tolerating po fluids.  Pt voiding. IVF continues at 125cc/hr.  Will continue to monitor.

## 2017-11-15 NOTE — PLAN OF CARE
Problem: Patient Care Overview  Goal: Individualization & Mutuality  Outcome: Improving  Observation Goals  - Nausea/vomiting (diarrhea if present) improved. -yes  - Tolerating oral intake to maintain hydration -yes  - Vital signs normal or at patient baseline and orthostatic vitals are normal and patient not lightheaded with standing -no  - Diagnostic tests and consults completed and resulted if ordered -yes  - Safe disposition plan has been identified -no

## 2017-11-15 NOTE — PLAN OF CARE
Problem: Patient Care Overview  Goal: Plan of Care/Patient Progress Review    Observation goals PRIOR TO DISCHARGE     Comments: List all goals to be met before discharge home   - Nausea/vomiting (diarrhea if present) improved. - not met  - Tolerating oral intake to maintain hydration - not met  - Vital signs normal or at patient baseline and orthostatic vitals are normal and patient not lightheaded with standing - not met, hypertensive  - Diagnostic tests and consults completed and resulted if ordered - not met, K rechecks    - Safe disposition plan has been identified  met  - Nurse to notify provider when observation goals have been met and patient is ready for discharge.          A&Ox4. Reports mild acid reflux discomfort but denies nausea. Last emesis this evening, 4x/today. K+ being replaced for 3.0. Tele monitored. NS @ 125. Up ind, regular diet.

## 2017-11-15 NOTE — PLAN OF CARE
Problem: Patient Care Overview  Goal: Plan of Care/Patient Progress Review  Outcome: Improving  PT is alert and oriented X4. Up independently.  Patient has been vomiting 4x today.  Recently received ativan, zofran and GI cocktail.  Waiting to reassess.  Potassium 2.2 on admission, IV replacement complete, redraw at 2030.

## 2017-11-15 NOTE — PROGRESS NOTES
The following criteria to be met before discharge:     1.   - Nausea/vomiting (diarrhea if present) improved - not met  2.   - Tolerating oral intake to maintain hydration - not met  3.   - Vital signs normal or at patient baseline and                   orthostatic vitals are normal and patient not lightheaded with standing  - met  4.   - Diagnostic tests and consults completed and resulted if ordered - met  5.   - Safe disposition plan has been identified - met

## 2018-08-04 ENCOUNTER — HEALTH MAINTENANCE LETTER (OUTPATIENT)
Age: 43
End: 2018-08-04

## 2019-09-28 ENCOUNTER — HEALTH MAINTENANCE LETTER (OUTPATIENT)
Age: 44
End: 2019-09-28

## 2021-01-10 ENCOUNTER — HEALTH MAINTENANCE LETTER (OUTPATIENT)
Age: 46
End: 2021-01-10

## 2021-03-13 ENCOUNTER — HEALTH MAINTENANCE LETTER (OUTPATIENT)
Age: 46
End: 2021-03-13

## 2021-10-23 ENCOUNTER — HEALTH MAINTENANCE LETTER (OUTPATIENT)
Age: 46
End: 2021-10-23

## 2022-02-12 ENCOUNTER — HEALTH MAINTENANCE LETTER (OUTPATIENT)
Age: 47
End: 2022-02-12

## 2022-03-22 ENCOUNTER — NURSE TRIAGE (OUTPATIENT)
Dept: NURSING | Facility: CLINIC | Age: 47
End: 2022-03-22
Payer: COMMERCIAL

## 2022-03-22 NOTE — TELEPHONE ENCOUNTER
Pt is needing a refill of her OCP.     advised that she call her providers office and speak with them as it sounds like she needs a visit.    The patient indicates understanding of these issues and agrees with the plan.    Ginger Roth RN  [place] Nurse Advisor  10:16 AM  3/22/2022      Additional Information    Caller has medication question only, adult not sick, and triager answers question    Protocols used: MEDICATION QUESTION CALL-A-[place]

## 2022-04-09 ENCOUNTER — HEALTH MAINTENANCE LETTER (OUTPATIENT)
Age: 47
End: 2022-04-09

## 2022-10-09 ENCOUNTER — HEALTH MAINTENANCE LETTER (OUTPATIENT)
Age: 47
End: 2022-10-09

## 2023-02-18 ENCOUNTER — HEALTH MAINTENANCE LETTER (OUTPATIENT)
Age: 48
End: 2023-02-18

## 2023-05-21 ENCOUNTER — HEALTH MAINTENANCE LETTER (OUTPATIENT)
Age: 48
End: 2023-05-21

## 2024-03-16 ENCOUNTER — HEALTH MAINTENANCE LETTER (OUTPATIENT)
Age: 49
End: 2024-03-16